# Patient Record
Sex: FEMALE | Race: WHITE | Employment: FULL TIME | URBAN - NONMETROPOLITAN AREA
[De-identification: names, ages, dates, MRNs, and addresses within clinical notes are randomized per-mention and may not be internally consistent; named-entity substitution may affect disease eponyms.]

---

## 2017-12-05 ENCOUNTER — OFFICE VISIT (OUTPATIENT)
Dept: OPTOMETRY | Age: 32
End: 2017-12-05
Payer: COMMERCIAL

## 2017-12-05 DIAGNOSIS — H52.203 HYPEROPIA OF BOTH EYES WITH ASTIGMATISM: Primary | ICD-10-CM

## 2017-12-05 DIAGNOSIS — H52.03 HYPEROPIA OF BOTH EYES WITH ASTIGMATISM: Primary | ICD-10-CM

## 2017-12-05 PROCEDURE — 92014 COMPRE OPH EXAM EST PT 1/>: CPT | Performed by: OPTOMETRIST

## 2017-12-05 RX ORDER — BENOXINATE HCL/FLUORESCEIN SOD 0.4%-0.25%
1 DROPS OPHTHALMIC (EYE) ONCE
Status: COMPLETED | OUTPATIENT
Start: 2017-12-05 | End: 2017-12-05

## 2017-12-05 RX ADMIN — Medication 1 DROP: at 09:28

## 2017-12-05 ASSESSMENT — KERATOMETRY
OD_K1POWER_DIOPTERS: 42.75
OS_K1POWER_DIOPTERS: 42.50
OS_AXISANGLE2_DEGREES: 178
OS_K2POWER_DIOPTERS: 43.50
OD_AXISANGLE_DEGREES: 083
OS_AXISANGLE_DEGREES: 088
OD_AXISANGLE2_DEGREES: 173
OD_K2POWER_DIOPTERS: 43.50

## 2017-12-05 ASSESSMENT — REFRACTION_MANIFEST
OS_AXIS: 010
OD_AXIS: 122
OS_CYLINDER: -0.25
OD_SPHERE: +0.50
OD_CYLINDER: -0.25
OS_SPHERE: +0.50

## 2017-12-05 ASSESSMENT — REFRACTION_WEARINGRX
OS_AXIS: 015
OD_AXIS: 125
OD_CYLINDER: -0.25
SPECS_TYPE: SVL
OD_SPHERE: +0.50
OS_SPHERE: +0.50
OS_CYLINDER: -0.25

## 2017-12-05 ASSESSMENT — VISUAL ACUITY
METHOD: SNELLEN - LINEAR
OD_SC: 20/20
OS_SC: 20/20

## 2017-12-05 ASSESSMENT — SLIT LAMP EXAM - LIDS
COMMENTS: NORMAL
COMMENTS: NORMAL

## 2017-12-05 ASSESSMENT — TONOMETRY
OD_IOP_MMHG: 19
OS_IOP_MMHG: 19
IOP_METHOD: APPLANATION W FLURESS DROP

## 2019-08-23 ENCOUNTER — OFFICE VISIT (OUTPATIENT)
Dept: OPTOMETRY | Age: 34
End: 2019-08-23
Payer: COMMERCIAL

## 2019-08-23 DIAGNOSIS — H52.203 HYPEROPIA OF BOTH EYES WITH ASTIGMATISM: ICD-10-CM

## 2019-08-23 DIAGNOSIS — H52.03 HYPEROPIA OF BOTH EYES WITH ASTIGMATISM: ICD-10-CM

## 2019-08-23 DIAGNOSIS — H53.8 BLURRED VISION, BILATERAL: Primary | ICD-10-CM

## 2019-08-23 DIAGNOSIS — R51.9 ACUTE NONINTRACTABLE HEADACHE, UNSPECIFIED HEADACHE TYPE: ICD-10-CM

## 2019-08-23 PROCEDURE — G8421 BMI NOT CALCULATED: HCPCS | Performed by: OPTOMETRIST

## 2019-08-23 PROCEDURE — G8427 DOCREV CUR MEDS BY ELIG CLIN: HCPCS | Performed by: OPTOMETRIST

## 2019-08-23 PROCEDURE — 4004F PT TOBACCO SCREEN RCVD TLK: CPT | Performed by: OPTOMETRIST

## 2019-08-23 PROCEDURE — 99214 OFFICE O/P EST MOD 30 MIN: CPT | Performed by: OPTOMETRIST

## 2019-08-23 ASSESSMENT — VISUAL ACUITY
OS_SC: 20/20
OD_SC+: -1
OS_SC+: -1
METHOD: SNELLEN - LINEAR
OD_SC: 20/20 OU
OD_SC: 20/20

## 2019-08-23 ASSESSMENT — ENCOUNTER SYMPTOMS
ALLERGIC/IMMUNOLOGIC NEGATIVE: 0
GASTROINTESTINAL NEGATIVE: 0
RESPIRATORY NEGATIVE: 0
EYES NEGATIVE: 0

## 2019-08-23 ASSESSMENT — TONOMETRY
OS_IOP_MMHG: 21
IOP_METHOD: NON-CONTACT AIR PUFF
OD_IOP_MMHG: 18

## 2019-08-23 ASSESSMENT — REFRACTION_WEARINGRX
SPECS_TYPE: SVL
OS_AXIS: 010
OD_SPHERE: +0.50
OD_AXIS: 122
OS_CYLINDER: -0.25
OS_SPHERE: +0.50
OD_CYLINDER: -0.25

## 2019-08-23 ASSESSMENT — SLIT LAMP EXAM - LIDS
COMMENTS: NORMAL
COMMENTS: NORMAL

## 2019-08-23 ASSESSMENT — REFRACTION_MANIFEST
OD_AXIS: 115
OS_AXIS: 010
OS_CYLINDER: -0.50
OD_CYLINDER: -0.50
OS_SPHERE: +1.00
OD_SPHERE: +1.00

## 2021-01-23 ENCOUNTER — HOSPITAL ENCOUNTER (OUTPATIENT)
Age: 36
Setting detail: SPECIMEN
Discharge: HOME | End: 2021-01-23
Payer: MEDICARE

## 2021-01-23 ENCOUNTER — OFFICE VISIT (OUTPATIENT)
Dept: FAMILY MEDICINE CLINIC | Age: 36
End: 2021-01-23
Payer: MEDICARE

## 2021-01-23 VITALS
DIASTOLIC BLOOD PRESSURE: 62 MMHG | TEMPERATURE: 98.7 F | OXYGEN SATURATION: 98 % | BODY MASS INDEX: 19.2 KG/M2 | SYSTOLIC BLOOD PRESSURE: 98 MMHG | HEART RATE: 104 BPM | WEIGHT: 105 LBS

## 2021-01-23 DIAGNOSIS — J32.8 OTHER CHRONIC SINUSITIS: ICD-10-CM

## 2021-01-23 DIAGNOSIS — J01.91 ACUTE RECURRENT SINUSITIS, UNSPECIFIED LOCATION: ICD-10-CM

## 2021-01-23 DIAGNOSIS — R22.0 LEFT FACIAL SWELLING: ICD-10-CM

## 2021-01-23 DIAGNOSIS — J01.91 ACUTE RECURRENT SINUSITIS, UNSPECIFIED LOCATION: Primary | ICD-10-CM

## 2021-01-23 PROCEDURE — 99212 OFFICE O/P EST SF 10 MIN: CPT

## 2021-01-23 PROCEDURE — 99213 OFFICE O/P EST LOW 20 MIN: CPT | Performed by: PHYSICIAN ASSISTANT

## 2021-01-23 PROCEDURE — 4004F PT TOBACCO SCREEN RCVD TLK: CPT | Performed by: PHYSICIAN ASSISTANT

## 2021-01-23 PROCEDURE — G8420 CALC BMI NORM PARAMETERS: HCPCS | Performed by: PHYSICIAN ASSISTANT

## 2021-01-23 PROCEDURE — G8427 DOCREV CUR MEDS BY ELIG CLIN: HCPCS | Performed by: PHYSICIAN ASSISTANT

## 2021-01-23 PROCEDURE — G8484 FLU IMMUNIZE NO ADMIN: HCPCS | Performed by: PHYSICIAN ASSISTANT

## 2021-01-23 PROCEDURE — U0003 INFECTIOUS AGENT DETECTION BY NUCLEIC ACID (DNA OR RNA); SEVERE ACUTE RESPIRATORY SYNDROME CORONAVIRUS 2 (SARS-COV-2) (CORONAVIRUS DISEASE [COVID-19]), AMPLIFIED PROBE TECHNIQUE, MAKING USE OF HIGH THROUGHPUT TECHNOLOGIES AS DESCRIBED BY CMS-2020-01-R: HCPCS

## 2021-01-23 RX ORDER — LEVOFLOXACIN 500 MG/1
500 TABLET, FILM COATED ORAL DAILY
Qty: 10 TABLET | Refills: 0 | Status: SHIPPED | OUTPATIENT
Start: 2021-01-23 | End: 2021-02-02

## 2021-01-23 RX ORDER — PREDNISONE 20 MG/1
20 TABLET ORAL 2 TIMES DAILY
Qty: 10 TABLET | Refills: 0 | Status: SHIPPED | OUTPATIENT
Start: 2021-01-23 | End: 2021-01-28

## 2021-01-23 ASSESSMENT — ENCOUNTER SYMPTOMS
SORE THROAT: 0
COUGH: 1
TROUBLE SWALLOWING: 0
SINUS PAIN: 1
WHEEZING: 0
DIARRHEA: 0
VOMITING: 0
SHORTNESS OF BREATH: 0
SINUS PRESSURE: 1
CHEST TIGHTNESS: 0
NAUSEA: 0
RHINORRHEA: 1

## 2021-01-23 ASSESSMENT — PATIENT HEALTH QUESTIONNAIRE - PHQ9
SUM OF ALL RESPONSES TO PHQ QUESTIONS 1-9: 0

## 2021-01-23 NOTE — PROGRESS NOTES
[facility] Practice    Subjective:      Patient ID: Tod Song is a 28 y.o. y.o. female. Patient is seen due to sinus sx for several months. Has green mucous from nose. Nose is really dry some blood noted. The left ear has pressure popping and cracking noted. Just the left face and ear hurts. The left maxillary area was swollen this am.  Was on zpak first did nothing. Then was on bactrim it helped while on it and then sx returned. Finished this 2 weeks ago. No humidifier. Took tylenol it helps some. Has flonase has not used. No one mentioned using sinus rinses to her. Is a smoker. Has taste and smell. She came in today due to the pain and swelling in the left face. She has not had this kind of thing until today. Over the past few months all sx have been on left face neck throat. Throat is fine now but hurt initially  Initially the postnasal drip was much worse. Sx have changed over the months but will never go away. She admits she has poor dentition as well has not seen a dentist.  Her teeth do not hurt currently and no oral abscess noted either.           Past Medical History:   Diagnosis Date    Allergic rhinitis     Anxiety and depression     Asthma     as a child, no recent symptoms    Gestational hypertension     history of    Hyperopia with astigmatism     Panic disorder     Tobacco use        Past Surgical History:   Procedure Laterality Date     SECTION, LOW TRANSVERSE  2006    Dr. Brian Stone, [facility] [address] [place] [address]      dysfunctional uterine bleeding       Family History   Problem Relation Age of Onset    Cancer Mother    [place]ec[place] Re Migraines Mother     Depression Mother     Cancer Father         unsure of type    Asthma Son     Glaucoma Neg Hx     Cataracts Neg Hx     Diabetes Neg Hx        Allergies   Allergen Reactions    Penicillins Rash       Current Outpatient Medications   Medication Sig Dispense Refill    levoFLOXacin line had pain in the left maxillary area and face. Right Ear: Tympanic membrane, ear canal and external ear normal.      Left Ear: Tympanic membrane, ear canal and external ear normal.      Nose: No congestion or rhinorrhea. Comments: Nasal turbinates are mildly boggy and dry. Mouth/Throat:      Mouth: Mucous membranes are moist.      Pharynx: No oropharyngeal exudate or posterior oropharyngeal erythema. Comments: Postnasal drip noted. Eyes:      General: No scleral icterus. Conjunctiva/sclera: Conjunctivae normal.      Comments: She has dark circles infraorbitally. Neck:      Musculoskeletal: Normal range of motion and neck supple. No neck rigidity or muscular tenderness. Cardiovascular:      Rate and Rhythm: Normal rate and regular rhythm. Heart sounds: Normal heart sounds. No murmur. Pulmonary:      Effort: Pulmonary effort is normal. No respiratory distress. Breath sounds: Normal breath sounds. No wheezing, rhonchi or rales. Abdominal:      General: There is no distension. Palpations: Abdomen is soft. There is no mass. Tenderness: There is no abdominal tenderness. There is no guarding or rebound. Hernia: No hernia is present. Comments: BS diminished in general.   Musculoskeletal:         General: No swelling, tenderness, deformity or signs of injury. Right lower leg: No edema. Left lower leg: No edema. Lymphadenopathy:      Cervical: No cervical adenopathy. Skin:     General: Skin is warm and dry. Findings: No erythema or rash. Neurological:      General: No focal deficit present. Mental Status: She is alert and oriented to person, place, and time. Sensory: No sensory deficit. Gait: Gait normal.   Psychiatric:         Mood and Affect: Mood normal.         Behavior: Behavior normal.           Assessment & Plan:     1. Acute recurrent sinusitis, unspecified location    - levoFLOXacin (LEVAQUIN) 500 MG tablet;  Take 1 tablet by mouth daily for 10 days  Dispense: 10 tablet; Refill: 0  - predniSONE (DELTASONE) 20 MG tablet; Take 1 tablet by mouth 2 times daily for 5 days  Dispense: 10 tablet; Refill: 0  - CT SINUS W WO CONTRAST; Future  - COVID-19 Ambulatory; Future    2. Left facial swelling    - levoFLOXacin (LEVAQUIN) 500 MG tablet; Take 1 tablet by mouth daily for 10 days  Dispense: 10 tablet; Refill: 0  - predniSONE (DELTASONE) 20 MG tablet; Take 1 tablet by mouth 2 times daily for 5 days  Dispense: 10 tablet; Refill: 0  - CT SINUS W WO CONTRAST; Future  - COVID-19 Ambulatory; Future      Will refer to ENT but want to see CT first and covid test  Warm compresses recommended  Sent to [facility] for stat CT sinuses  Will call patient later today with results  Answered her questions  Needs to quarantine until covid test is back   She can be given work note. Fluids rest   Recommended humidifier  Start using her flonase  Further recommendations based on CT results  Needs follow up visit will give guidance based on results  Needs to see dentistry as well      I contacted patient with results of CT sinuses she had chronic left maxillary and sphenoid sinusitis and acute and chronic right maxillary sinusitis. Will refer to ENT   Advised most likely will need longer tx with antibiotic but will have her follow up appropriately with PCP or us at walk in clinic. Will see how soon ENT can get her in as well.   Answered questions    Yaneli Junior  1/23/2021 9:33 AM EST    (Pleasenote that portions of this note were completed with a voice recognition program.Efforts were made to edit the dictations but occasionally words are mis-transcribed.)

## 2021-01-23 NOTE — LETTER
[facility]  [address] [address]  Phone: [phone]  Fax: [phone]    BELGICA Burnett NP          Patient: Eloisa Up   YOB: 1985   Date of Visit: 01/23/2021       To Whom it May Concern:    Checo Has was seen in my clinic on 01/23/2021. She may return to work/school after a negative covid test result (results expected in appx 5-7 days) and marked symptom improvement. Patient should be fever free for 24 hours without medication. If you have any questions or concerns, please don't hesitate to call.     Sincerely,         BELGICA Burnett NP

## 2021-01-26 ENCOUNTER — OFFICE VISIT (OUTPATIENT)
Dept: OTOLARYNGOLOGY | Age: 36
End: 2021-01-26
Payer: MEDICARE

## 2021-01-26 VITALS
SYSTOLIC BLOOD PRESSURE: 118 MMHG | HEIGHT: 62 IN | BODY MASS INDEX: 19.36 KG/M2 | DIASTOLIC BLOOD PRESSURE: 84 MMHG | WEIGHT: 105.2 LBS

## 2021-01-26 DIAGNOSIS — J34.3 HYPERTROPHY OF INFERIOR NASAL TURBINATE: ICD-10-CM

## 2021-01-26 DIAGNOSIS — J32.4 CHRONIC PANSINUSITIS: ICD-10-CM

## 2021-01-26 DIAGNOSIS — J34.89 SINUS PRESSURE: ICD-10-CM

## 2021-01-26 DIAGNOSIS — R09.81 NASAL CONGESTION: Primary | ICD-10-CM

## 2021-01-26 LAB — SARS-COV-2, NAA: NOT DETECTED

## 2021-01-26 PROCEDURE — G8484 FLU IMMUNIZE NO ADMIN: HCPCS | Performed by: OTOLARYNGOLOGY

## 2021-01-26 PROCEDURE — 31231 NASAL ENDOSCOPY DX: CPT | Performed by: OTOLARYNGOLOGY

## 2021-01-26 PROCEDURE — 99204 OFFICE O/P NEW MOD 45 MIN: CPT | Performed by: OTOLARYNGOLOGY

## 2021-01-26 PROCEDURE — G8420 CALC BMI NORM PARAMETERS: HCPCS | Performed by: OTOLARYNGOLOGY

## 2021-01-26 PROCEDURE — 99212 OFFICE O/P EST SF 10 MIN: CPT

## 2021-01-26 PROCEDURE — G8427 DOCREV CUR MEDS BY ELIG CLIN: HCPCS | Performed by: OTOLARYNGOLOGY

## 2021-01-26 PROCEDURE — 4004F PT TOBACCO SCREEN RCVD TLK: CPT | Performed by: OTOLARYNGOLOGY

## 2021-01-26 NOTE — PROGRESS NOTES
2021 9:24 AM IRON Cardenas (:  1985) is a 28 y.o. female,New patient, here for evaluation of the following chief complaint(s):  Sinus Problem (referred from Brenda Mi for recurrent sinusitis)      ASSESSMENT/PLAN:  1. Nasal congestion    2. Chronic pansinusitis    3. Hypertrophy of inferior nasal turbinate    4. Sinus pressure      1. Nasal congestion  2. Chronic pansinusitis  3. Hypertrophy of inferior nasal turbinate  4. Sinus pressure    She wishes to continue medical management for now. She has follow-up with Brenda KIDD and she wishes to trial a 21 day antibiotic course. I discussed that if she continues to have symptoms following this trial of maximum medical management the next step would be endoscopic sinus surgery. Based on her symptoms, previous treatment and recent CT findings, she is already a candidate for endoscopic sinus surgery. Likely source of continued symptoms is the opacified left sphenoid sinus. However she wishes to pursue further medical options with her primary care. Briefly discussed endoscopic sinus surgery including risks, benefits, indications, alternatives, recovery. She may benefit from a repeat course of steroids during her next trial of antibiotics. She will call us in the next few weeks if the symptoms do not improve. Discussed that we would need to repeat her CT sinus imaging at [facility]) with [employer] navigation protocol. No follow-ups on file. SUBJECTIVE/OBJECTIVE:  HPI  27-year-old woman with chronic sinusitis. She endorses a strong childhood history of allergies. She also has a strong family history of allergies. They improved as she grew older and last year she would take a Claritin only as needed. She endorses 1-2 episodes of sinus infection versus upper respiratory infections a year.   More recently over the last few months she has been on 3 courses of antibiotics starting with a Z-Eulogio and then Bactrim, Flonase, antihistamine and currently is on Levaquin, prednisone. Her current symptoms include facial pain and pressure, worse on left side, bilateral nasal obstruction, green nasal drainage, denies change in smell. She has had minimal improvement in her left-sided facial pressure, the most prominent feature. She denies vision changes. Endorses some dryness to the front of her nose. Her current episode started with copious sinus drainage. She also experienced left facial swelling but that has improved on prednisone. She has discussed with Jesus KIDD re 1 month of antibiotic treatment as the next step. CT sinus performed 1/23/2021 at [facility]:  FINDINGS:            The paranasal sinuses are paired and well-developed bilaterally.            There is moderate chronic right and mild chronic left maxillary sinusitis.          There is mild mucosal thickening in the ethmoid sinuses and there is opacification of the left    sphenoid sinus.  The frontal sinuses are clear.          No air fluid levels, soft tissue masses or osseous erosions.          No mastoid effusions.          The regional bones are intact.            IMPRESSION:     1.  Multifocal sinusitis.           Review of Systems  ENT ROS: positive for - sinus pain    General: The patient is found to be alert and normally responsive female with grossly normal hearing, clear voice and normal articulation. Communication is without difficulty. Voice: Clear   Skin: The skin has normal colour and turgor. Face: The facial contour is symmetric at rest and with movement. Ears: The pinnae have normal contours. AD: EAC clear, TM intact, no effusion/erythema/retraction   AS: EAC clear, TM intact, no effusion/erythema/retraction   Eye: The ocular movements are full and symmetric, the conjunctiva is unremarkable; sclera are anicteric, pupillary response is symmetric. No nystagmus is found.     Nose:   The external nasal contour is normal  The nasal mucosa is inflamed  The nasal septum is straight. The turbinates are enlarged  The nares are patent without evidence of polyposis   Oral cavity:   The dentition is healthy. The oral mucosa is without lesions;  the tongue is symmetric with full mobility and is without fasciculation. The soft palate is symmetric. The oropharynx is unremarkable. Neck: The neck has a normal contour; no masses are found on palpation    Fiberoptic examination of the upper airway using scope was conducted after first applying topical phenylephrine (1%) and lidocaine (4%) to the bilateral nasal cavity by spray. The endoscope was inserted and advanced through the bilateral side of the nose examining the mucosa and nasal structures. Right:  Inferior turbinate hypertrophic, middle meatus clear, mucosa is inflamed, no polyps or purulence  Left:  Inferior turbinate hypertrophic, middle meatus clear, mucosa is inflamed, no polyps or purulence  Nasopharynx clear, ET patent, adenoid small. Septum midline. An electronic signature was used to authenticate this note.     --Nancy Burch MD     1/26/2021 9:24 AM EST

## 2021-02-05 ENCOUNTER — VIRTUAL VISIT (OUTPATIENT)
Dept: FAMILY MEDICINE CLINIC | Age: 36
End: 2021-02-05
Payer: MEDICARE

## 2021-02-05 DIAGNOSIS — J01.91 ACUTE RECURRENT SINUSITIS, UNSPECIFIED LOCATION: Primary | ICD-10-CM

## 2021-02-05 PROCEDURE — G8427 DOCREV CUR MEDS BY ELIG CLIN: HCPCS | Performed by: PHYSICIAN ASSISTANT

## 2021-02-05 PROCEDURE — 99212 OFFICE O/P EST SF 10 MIN: CPT | Performed by: PHYSICIAN ASSISTANT

## 2021-02-05 PROCEDURE — 99211 OFF/OP EST MAY X REQ PHY/QHP: CPT

## 2021-02-05 RX ORDER — FLUTICASONE PROPIONATE 50 MCG
1 SPRAY, SUSPENSION (ML) NASAL DAILY
COMMUNITY
Start: 2021-01-05 | End: 2021-03-26

## 2021-02-05 RX ORDER — NAPROXEN SODIUM 550 MG/1
550 TABLET ORAL 2 TIMES DAILY WITH MEALS
Qty: 30 TABLET | Refills: 1 | Status: SHIPPED | OUTPATIENT
Start: 2021-02-05 | End: 2021-03-26

## 2021-02-05 RX ORDER — CEFDINIR 300 MG/1
300 CAPSULE ORAL 2 TIMES DAILY
Qty: 60 CAPSULE | Refills: 0 | Status: SHIPPED | OUTPATIENT
Start: 2021-02-05 | End: 2021-03-07

## 2021-02-05 ASSESSMENT — ENCOUNTER SYMPTOMS
SORE THROAT: 0
SHORTNESS OF BREATH: 0
WHEEZING: 0
CHEST TIGHTNESS: 0
SINUS PAIN: 1
SINUS PRESSURE: 1
TROUBLE SWALLOWING: 0
DIARRHEA: 0
RHINORRHEA: 1
NAUSEA: 1
COUGH: 0
VOMITING: 0
EYE DISCHARGE: 1

## 2021-02-05 ASSESSMENT — PATIENT HEALTH QUESTIONNAIRE - PHQ9
SUM OF ALL RESPONSES TO PHQ QUESTIONS 1-9: 2
SUM OF ALL RESPONSES TO PHQ9 QUESTIONS 1 & 2: 2
SUM OF ALL RESPONSES TO PHQ QUESTIONS 1-9: 2

## 2021-02-05 NOTE — PATIENT INSTRUCTIONS
Patient Education        Learning About Benefits From Quitting Smoking  How does quitting smoking make you healthier? If you're thinking about quitting smoking, you may have a few reasons to be smoke-free. Your health may be one of them. · When you quit smoking, you lower your risks for cancer, lung disease, heart attack, stroke, blood vessel disease, and blindness from macular degeneration. · When you're smoke-free, you get sick less often, and you heal faster. You are less likely to get colds, flu, bronchitis, and pneumonia. · As a nonsmoker, you may find that your mood is better and you are less stressed. When and how will you feel healthier? Quitting has real health benefits that start from day 1 of being smoke-free. And the longer you stay smoke-free, the healthier you get and the better you feel. The first hours  · After just 20 minutes, your blood pressure and heart rate go down. That means there's less stress on your heart and blood vessels. · Within 12 hours, the level of carbon monoxide in your blood drops back to normal. That makes room for more oxygen. With more oxygen in your body, you may notice that you have more energy than when you smoked. After 2 weeks  · Your lungs start to work better. · Your risk of heart attack starts to drop. After 1 month  · When your lungs are clear, you cough less and breathe deeper, so it's easier to be active. · Your sense of taste and smell return. That means you can enjoy food more than you have since you started smoking. Over the years  · Over the years, your risks of heart disease, heart attack, and stroke are lower. · After 10 years, your risk of dying from lung cancer is cut by about half. And your risk for many other types of cancer is lower too. How would quitting help others in your life? When you quit smoking, you improve the health of everyone who now breathes in your smoke. · Their heart, lung, and cancer risks drop, much like yours. · They are sick less. For babies and small children, living smoke-free means they're less likely to have ear infections, pneumonia, and bronchitis. · If you're a woman who is or will be pregnant someday, quitting smoking means a healthier . · Children who are close to you are less likely to become adult smokers. Where can you learn more? Go to https://chpeshaanewwesley.Planet Daily. org and sign in to your [employer] account. Enter [phone] in the [facility] box to learn more about \"Learning About Benefits From Quitting Smoking. \"     If you do not have an account, please click on the \"Sign Up Now\" link. Current as of: 2020               Content Version: 12.6  © 8792-6446 [employer]. Care instructions adapted under license by [facility] ([facility]). If you have questions about a medical condition or this instruction, always ask your healthcare professional. [place] [address] any warranty or liability for your use of this information.

## 2021-02-05 NOTE — PROGRESS NOTES
2021    TELEHEALTH EVALUATION -- Audio/Visual (During HDLAD-56 public health emergency)    HPI:    Linda Mason (:  1985) has requested an audio/video evaluation for the following concern(s):    Patient is seen today following up on sinusitis. At this time she is not feeling better. I gave her levaquin and prednisone on 21. She saw ENT and decided to wait on surgery wants to try prolonged treatment. Eyes are seeping in am the past few days. No facial swelling. No fever chills. The HA's really bother her right now. They do not go away right away with antibiotics. She is taking tylenol currently. Taking 6-8 tabs a day. Wakes up with it and there all day. Pain is mid frontal area. Review of Systems   Constitutional: Negative for appetite change, chills, fatigue and fever. HENT: Positive for postnasal drip, rhinorrhea, sinus pressure and sinus pain. Negative for congestion, sneezing, sore throat and trouble swallowing. Eyes: Positive for discharge. Eyes are crusting and matted the past few days. Respiratory: Negative for cough, chest tightness, shortness of breath and wheezing. Cardiovascular: Negative for chest pain and palpitations. Gastrointestinal: Positive for nausea. Negative for diarrhea and vomiting. Genitourinary: Negative for difficulty urinating and dysuria. Musculoskeletal: Positive for neck pain and neck stiffness. Negative for arthralgias, gait problem and myalgias. Left side of neck is hurting too. Skin: Negative for rash. Neurological: Positive for dizziness, light-headedness and headaches. Negative for numbness. Psychiatric/Behavioral: Positive for sleep disturbance. The patient is not nervous/anxious. Prior to Visit Medications    Medication Sig Taking?  Authorizing Provider   fluticasone (FLONASE) 50 MCG/ACT nasal spray 1 spray by Nasal route daily Yes Historical Provider, MD cefdinir (OMNICEF) 300 MG capsule Take 1 capsule by mouth 2 times daily Yes MARTI Holder   naproxen sodium (ANAPROX) 550 MG tablet Take 1 tablet by mouth 2 times daily (with meals) Yes MARTI Holder       Social History     Tobacco Use    Smoking status: Current Every Day Smoker     Packs/day: 1.00     Years: 10.00     Pack years: 10.00    Smokeless tobacco: Never Used   Substance Use Topics    Alcohol use: No    Drug use: No        Allergies   Allergen Reactions    Penicillins Rash   ,   Past Medical History:   Diagnosis Date    Allergic rhinitis     Anxiety and depression     Asthma     as a child, no recent symptoms    Gestational hypertension     history of    Hyperopia with astigmatism     Panic disorder     Tobacco use    ,   Past Surgical History:   Procedure Laterality Date     SECTION, LOW TRANSVERSE  2006    Dr. Helder Og, [address]  2009    dysfunctional uterine bleeding   ,   Social History     Tobacco Use    Smoking status: Current Every Day Smoker     Packs/day: 1.00     Years: 10.00     Pack years: 10.00    Smokeless tobacco: Never Used   Substance Use Topics    Alcohol use: No    Drug use: No   ,   Family History   Problem Relation Age of Onset    Cancer Mother    [facility] Migraines Mother     Depression Mother     Cancer Father         unsure of type    Asthma Son     Glaucoma Neg Hx     Cataracts Neg Hx     Diabetes Neg Hx    ,   Immunization History   Administered Date(s) Administered    MMR 1998    Td (Adult), 5 Lf Tetanus Toxoid, Pf (Tenivac, Decavac) 2006   ,   Health Maintenance   Topic Date Due    Hepatitis C screen  1985    Varicella vaccine (1 of 2 - 2-dose childhood series) 1986    Pneumococcal 0-64 years Vaccine (1 of 1 - PPSV23) 1991    HIV screen  2000    DTaP/Tdap/Td vaccine (1 - Tdap) 2004    Cervical cancer screen  2006  Flu vaccine (1) 09/01/2020    Hepatitis A vaccine  Aged Out    Hepatitis B vaccine  Aged Out    Hib vaccine  Aged Out    Meningococcal (ACWY) vaccine  Aged Out       PHYSICAL EXAMINATION:  [ INSTRUCTIONS:  \"[x]\" Indicates a positive item  \"[]\" Indicates a negative item  -- DELETE ALL ITEMS NOT EXAMINED]  Vital Signs: (As obtained by patient/caregiver or practitioner observation)    Blood pressure-  Heart rate-    Respiratory rate- WNL   Temperature-  Pulse oximetry-     Constitutional: [x] Appears well-developed and well-nourished [x] No apparent distress      [] Abnormal-   Mental status  [x] Alert and awake  [x] Oriented to person/place/time [x]Able to follow commands      Eyes:  EOM    [x]  Normal  [] Abnormal-  Sclera  [x]  Normal  [] Abnormal -         Discharge [x]  None visible  [] Abnormal -    HENT:   [x] Normocephalic, atraumatic.   [] Abnormal   [] Mouth/Throat: Mucous membranes are moist.     External Ears [x] Normal  [] Abnormal-     Neck: [x] No visualized mass     Pulmonary/Chest: [x] Respiratory effort normal.  [x] No visualized signs of difficulty breathing or respiratory distress        [] Abnormal-      Musculoskeletal:   [] Normal gait with no signs of ataxia         [x] Normal range of motion of neck        [] Abnormal-       Neurological:        [x] No Facial Asymmetry (Cranial nerve 7 motor function) (limited exam to video visit)          [x] No gaze palsy        [] Abnormal-         Skin:        [x] No significant exanthematous lesions or discoloration noted on facial skin         [] Abnormal-            Psychiatric:       [x] Normal Affect [x] No Hallucinations        [] Abnormal-     Other pertinent observable physical exam findings-   Ct Sinus Wo Contrast    Result Date: 1/24/2021 [facility], [address] [place] [address]                                                                                                 CT Scan Report                                                  Signed    Patient: Ambrose Alicia                                                                MR#: [id number]  [id number]          : 1985                                                                [de-identified]            Age/Sex: 28 / F                                                                ADM Date: 21            Loc: CT                                                                                     Attending Dr: Jennifer KIDD  Primary Care Dr. Corina Vang CNP      Ordering Physician: Markus KIDD  Date of Service: 21  Procedure(s): CT sinus wo con  Accession Number(s): [id number]    cc: Lisa Galvez CNP       TECHNIQUE/VIEWS:  Axial images obtained along with multiplanar reconstructed images from the 3D data set that includes coronal and sagittal views. Automatic exposure control techniques were used to obtain the study. CT of the sinuses without contrast.    CLINICAL HISTORY:  28year-old female with sinusitis which is not responding to antibiotic therapy    COMPARISON:  No relevant prior studies available. FINDINGS:      The paranasal sinuses are paired and well-developed bilaterally. There is moderate chronic right and mild chronic left maxillary sinusitis. There is mild mucosal thickening in the ethmoid sinuses and there is opacification of the left sphenoid sinus. The frontal sinuses are clear. No air fluid levels, soft tissue masses or osseous erosions. No mastoid effusions. The regional bones are intact. IMPRESSION:  1.  Multifocal sinusitis.         Dictated By:        Pawan Sanders Signed By:          <Electronically signed by Ganesh Marion DO>                                          01/25/21 1303                                                                                                                                                                          DD: 01/24/21                                                        TD/TT: 01/25/21 4851     : ALISHA    ASSESSMENT/PLAN:  1. Acute recurrent sinusitis, unspecified location    - cefdinir (OMNICEF) 300 MG capsule; Take 1 capsule by mouth 2 times daily  Dispense: 60 capsule; Refill: 0  - naproxen sodium (ANAPROX) 550 MG tablet; Take 1 tablet by mouth 2 times daily (with meals)  Dispense: 30 tablet; Refill: 1     fluids nsaids rest  Follow up 6 weeks sooner If not improving   Answered her questions  If does not improve will have to follow up with ENT  If any problems with medications let me kow    No follow-ups on file. Kvng Wolfe is a 28 y.o. female being evaluated by a Virtual Visit (video visit) encounter to address concerns as mentioned above. A caregiver was present when appropriate. Due to this being a TeleHealth encounter (During [facility]- public health emergency), evaluation of the following organ systems was limited: Vitals/Constitutional/EENT/Resp/CV/GI//MS/Neuro/Skin/Heme-Lymph-Imm. Pursuant to the emergency declaration under the [address] [place] [address] authority and the [employer] and [employer] Act, this Virtual Visit was conducted with patient's (and/or legal guardian's) consent, to reduce the patient's risk of exposure to COVID-19 and provide necessary medical care. The patient (and/or legal guardian) has also been advised to contact this office for worsening conditions or problems, and seek emergency medical treatment and/or call 911 if deemed necessary. Patient identification was verified at the start of the visit: Yes    Total time spent on this encounter: 10-15 minutes    Services were provided through a video synchronous discussion virtually to substitute for in-person clinic visit. Patient and provider were located at their individual homes. --MARTI Gustafson on 2/5/2021 at 3:05 PM    An electronic signature was used to authenticate this note.

## 2021-03-12 ENCOUNTER — TELEPHONE (OUTPATIENT)
Dept: OTOLARYNGOLOGY | Age: 36
End: 2021-03-12

## 2021-03-12 NOTE — TELEPHONE ENCOUNTER
Patient called x2. Spoke with Analisa Mccarthy who will call her on Monday to schedule surgery. Patient verbalized understanding.

## 2021-03-15 DIAGNOSIS — J32.9 CHRONIC SINUSITIS, UNSPECIFIED LOCATION: Primary | ICD-10-CM

## 2021-03-15 NOTE — TELEPHONE ENCOUNTER
Writer r/c to pt and scheduled her for CT sinus on 03/24/21 and a follow up to discuss sinus surgery on 03/26/21 with Dr Emmy Marshall. Pt verbalized understanding.

## 2021-03-24 ENCOUNTER — HOSPITAL ENCOUNTER (OUTPATIENT)
Dept: CT IMAGING | Age: 36
Discharge: HOME | End: 2021-03-26
Payer: MEDICARE

## 2021-03-24 DIAGNOSIS — J32.9 CHRONIC SINUSITIS, UNSPECIFIED LOCATION: ICD-10-CM

## 2021-03-24 PROCEDURE — 70486 CT MAXILLOFACIAL W/O DYE: CPT

## 2021-03-26 ENCOUNTER — OFFICE VISIT (OUTPATIENT)
Dept: OTOLARYNGOLOGY | Age: 36
End: 2021-03-26
Payer: MEDICARE

## 2021-03-26 VITALS
HEIGHT: 62 IN | DIASTOLIC BLOOD PRESSURE: 76 MMHG | BODY MASS INDEX: 19.32 KG/M2 | HEART RATE: 77 BPM | SYSTOLIC BLOOD PRESSURE: 118 MMHG | WEIGHT: 105 LBS | TEMPERATURE: 98.1 F

## 2021-03-26 DIAGNOSIS — J34.3 HYPERTROPHY OF INFERIOR NASAL TURBINATE: ICD-10-CM

## 2021-03-26 DIAGNOSIS — J32.9 CHRONIC SINUSITIS, UNSPECIFIED LOCATION: Primary | ICD-10-CM

## 2021-03-26 DIAGNOSIS — J34.89 SINUS PRESSURE: ICD-10-CM

## 2021-03-26 DIAGNOSIS — J34.2 DEVIATED SEPTUM: ICD-10-CM

## 2021-03-26 DIAGNOSIS — J32.4 CHRONIC PANSINUSITIS: ICD-10-CM

## 2021-03-26 DIAGNOSIS — R51.9 NONINTRACTABLE EPISODIC HEADACHE, UNSPECIFIED HEADACHE TYPE: ICD-10-CM

## 2021-03-26 PROCEDURE — 99213 OFFICE O/P EST LOW 20 MIN: CPT | Performed by: OTOLARYNGOLOGY

## 2021-03-26 PROCEDURE — G8420 CALC BMI NORM PARAMETERS: HCPCS | Performed by: OTOLARYNGOLOGY

## 2021-03-26 PROCEDURE — G8484 FLU IMMUNIZE NO ADMIN: HCPCS | Performed by: OTOLARYNGOLOGY

## 2021-03-26 PROCEDURE — G8428 CUR MEDS NOT DOCUMENT: HCPCS | Performed by: OTOLARYNGOLOGY

## 2021-03-26 PROCEDURE — 4004F PT TOBACCO SCREEN RCVD TLK: CPT | Performed by: OTOLARYNGOLOGY

## 2021-03-26 PROCEDURE — 99215 OFFICE O/P EST HI 40 MIN: CPT | Performed by: OTOLARYNGOLOGY

## 2021-03-26 NOTE — PROGRESS NOTES
3/26/2021 9:24 AM IRON Johnson (:  1985) is a 28 y.o. female,New patient, here for evaluation of the following chief complaint(s):  Nasal Congestion (follow up CT scan)      ASSESSMENT/PLAN:  1. Chronic sinusitis, unspecified location    2. Chronic pansinusitis    3. Sinus pressure    4. Hypertrophy of inferior nasal turbinate    5. Nonintractable episodic headache, unspecified headache type    6. Deviated septum      1. Chronic sinusitis, unspecified location  -     CBC With Auto Differential; Future  -     Basic Metabolic Panel; Future  -     EKG 12 lead; Future  2. Chronic pansinusitis  3. Sinus pressure  4. Hypertrophy of inferior nasal turbinate  5. Nonintractable episodic headache, unspecified headache type  6. Deviated septum    Continues to have chronic sinus pressure and headaches in particular across her forehead and around her eyes. She has undergone maximum medical management with some improvement but continued and chronic symptoms. CT scan shows continued structural issues, sinus opening narrowing, and sinus disease. Recommend bilateral endoscopic sinus surgery, septoplasty, bilateral inferior turbinate reduction. Cbc, bmp, ekg     No follow-ups on file. SUBJECTIVE/OBJECTIVE:  HPI  27-year-old woman with chronic sinusitis. She endorses a strong childhood history of allergies. She also has a strong family history of allergies. They improved as she grew older and last year she would take a Claritin only as needed. She endorses 1-2 episodes of sinus infection versus upper respiratory infections a year. More recently over the last few months she has been on 3 courses of antibiotics starting with a Z-Eulogio and then Bactrim, Flonase, antihistamine and currently is on Levaquin, prednisone. Her current symptoms include facial pain and pressure, worse on left side, bilateral nasal obstruction, green nasal drainage, denies change in smell.   She has had minimal improvement in her left-sided facial pressure, the most prominent feature. She denies vision changes. Endorses some dryness to the front of her nose. Her current episode started with copious sinus drainage. She also experienced left facial swelling but that has improved on prednisone. She has completed 3-week course of antibiotics but continues to have headaches and sinus pressure. He also endorses some nasal obstruction. CT sinus performed 1/23/2021 at [facility]:  FINDINGS:            The paranasal sinuses are paired and well-developed bilaterally.            There is moderate chronic right and mild chronic left maxillary sinusitis.          There is mild mucosal thickening in the ethmoid sinuses and there is opacification of the left    sphenoid sinus.  The frontal sinuses are clear.          No air fluid levels, soft tissue masses or osseous erosions.          No mastoid effusions.          The regional bones are intact.            IMPRESSION:     1.  Multifocal sinusitis.           Review of Systems  ENT ROS: positive for - sinus pain    General: The patient is found to be alert and normally responsive female with grossly normal hearing, clear voice and normal articulation. Communication is without difficulty. Voice: Clear   Skin: The skin has normal colour and turgor. Face: The facial contour is symmetric at rest and with movement. Ears: The pinnae have normal contours. AD: EAC clear, TM intact, no effusion/erythema/retraction   AS: EAC clear, TM intact, no effusion/erythema/retraction   Eye: The ocular movements are full and symmetric, the conjunctiva is unremarkable; sclera are anicteric, pupillary response is symmetric. No nystagmus is found. Nose:   The external nasal contour is normal  The nasal mucosa is inflamed  The caudal nasal septum is straight. The turbinates are enlarged  The nares are patent without evidence of polyposis   Oral cavity:   The dentition is healthy.   The oral mucosa is without lesions;  the tongue is symmetric with full mobility and is without fasciculation. The soft palate is symmetric. The oropharynx is unremarkable. Neck: The neck has a normal contour; no masses are found on palpation    Nasal endo 1/26:   Fiberoptic examination of the upper airway using scope was conducted after first applying topical phenylephrine (1%) and lidocaine (4%) to the bilateral nasal cavity by spray. The endoscope was inserted and advanced through the bilateral side of the nose examining the mucosa and nasal structures. Right:  Inferior turbinate hypertrophic, middle meatus clear, mucosa is inflamed, no polyps or purulence  Left:  Inferior turbinate hypertrophic, middle meatus clear, mucosa is inflamed, no polyps or purulence  Nasopharynx clear, ET patent, adenoid small. Septum deviated to the right    An electronic signature was used to authenticate this note.     --Marnie Wood MD     3/26/2021 9:24 AM EST

## 2021-04-21 ENCOUNTER — HOSPITAL ENCOUNTER (OUTPATIENT)
Dept: NON INVASIVE DIAGNOSTICS | Age: 36
Discharge: HOME | End: 2021-04-21
Payer: MEDICARE

## 2021-04-21 ENCOUNTER — HOSPITAL ENCOUNTER (OUTPATIENT)
Dept: LAB | Age: 36
Discharge: HOME | End: 2021-04-21
Payer: MEDICARE

## 2021-04-21 DIAGNOSIS — J32.9 CHRONIC SINUSITIS, UNSPECIFIED LOCATION: ICD-10-CM

## 2021-04-21 LAB
ABSOLUTE EOS #: 0.12 K/UL (ref 0–0.44)
ABSOLUTE IMMATURE GRANULOCYTE: <0.03 K/UL (ref 0–0.3)
ABSOLUTE LYMPH #: 2.23 K/UL (ref 1.1–3.7)
ABSOLUTE MONO #: 0.38 K/UL (ref 0.1–1.2)
ANION GAP SERPL CALCULATED.3IONS-SCNC: 8 MMOL/L (ref 9–17)
BASOPHILS # BLD: 1 % (ref 0–2)
BASOPHILS ABSOLUTE: 0.04 K/UL (ref 0–0.2)
BUN BLDV-MCNC: 16 MG/DL (ref 6–20)
BUN/CREAT BLD: 30 (ref 9–20)
CALCIUM SERPL-MCNC: 9.6 MG/DL (ref 8.6–10.4)
CHLORIDE BLD-SCNC: 104 MMOL/L (ref 98–107)
CO2: 28 MMOL/L (ref 20–31)
CREAT SERPL-MCNC: 0.54 MG/DL (ref 0.5–0.9)
DIFFERENTIAL TYPE: ABNORMAL
EKG ATRIAL RATE: 74 BPM
EKG P AXIS: 57 DEGREES
EKG P-R INTERVAL: 142 MS
EKG Q-T INTERVAL: 376 MS
EKG QRS DURATION: 86 MS
EKG QTC CALCULATION (BAZETT): 417 MS
EKG R AXIS: 62 DEGREES
EKG T AXIS: 53 DEGREES
EKG VENTRICULAR RATE: 74 BPM
EOSINOPHILS RELATIVE PERCENT: 2 % (ref 1–4)
GFR AFRICAN AMERICAN: >60 ML/MIN
GFR NON-AFRICAN AMERICAN: >60 ML/MIN
GFR SERPL CREATININE-BSD FRML MDRD: ABNORMAL ML/MIN/{1.73_M2}
GFR SERPL CREATININE-BSD FRML MDRD: ABNORMAL ML/MIN/{1.73_M2}
GLUCOSE BLD-MCNC: 78 MG/DL (ref 70–99)
HCT VFR BLD CALC: 41.5 % (ref 36.3–47.1)
HEMOGLOBIN: 13.9 G/DL (ref 11.9–15.1)
IMMATURE GRANULOCYTES: 0 %
LYMPHOCYTES # BLD: 37 % (ref 24–43)
MCH RBC QN AUTO: 34.2 PG (ref 25.2–33.5)
MCHC RBC AUTO-ENTMCNC: 33.5 G/DL (ref 25.2–33.5)
MCV RBC AUTO: 102 FL (ref 82.6–102.9)
MONOCYTES # BLD: 6 % (ref 3–12)
NRBC AUTOMATED: 0 PER 100 WBC
PDW BLD-RTO: 11.7 % (ref 11.8–14.4)
PLATELET # BLD: 212 K/UL (ref 138–453)
PLATELET ESTIMATE: ABNORMAL
PMV BLD AUTO: 8.6 FL (ref 8.1–13.5)
POTASSIUM SERPL-SCNC: 4.2 MMOL/L (ref 3.7–5.3)
RBC # BLD: 4.07 M/UL (ref 3.95–5.11)
RBC # BLD: ABNORMAL 10*6/UL
SEG NEUTROPHILS: 54 % (ref 36–65)
SEGMENTED NEUTROPHILS ABSOLUTE COUNT: 3.32 K/UL (ref 1.5–8.1)
SODIUM BLD-SCNC: 140 MMOL/L (ref 135–144)
WBC # BLD: 6.1 K/UL (ref 3.5–11.3)
WBC # BLD: ABNORMAL 10*3/UL

## 2021-04-21 PROCEDURE — 93005 ELECTROCARDIOGRAM TRACING: CPT

## 2021-04-21 PROCEDURE — 80048 BASIC METABOLIC PNL TOTAL CA: CPT

## 2021-04-21 PROCEDURE — 36415 COLL VENOUS BLD VENIPUNCTURE: CPT

## 2021-04-21 PROCEDURE — 85025 COMPLETE CBC W/AUTO DIFF WBC: CPT

## 2021-04-28 ENCOUNTER — TELEPHONE (OUTPATIENT)
Dept: PREADMISSION TESTING | Age: 36
End: 2021-04-28

## 2021-05-07 ENCOUNTER — TELEPHONE (OUTPATIENT)
Dept: OTOLARYNGOLOGY | Age: 36
End: 2021-05-07

## 2021-05-07 ENCOUNTER — HOSPITAL ENCOUNTER (OUTPATIENT)
Dept: PREADMISSION TESTING | Age: 36
Setting detail: SPECIMEN
Discharge: HOME | End: 2021-05-11
Payer: MEDICARE

## 2021-05-07 DIAGNOSIS — Z11.59 ENCOUNTER FOR SCREENING FOR OTHER VIRAL DISEASES: Primary | ICD-10-CM

## 2021-05-07 PROCEDURE — U0003 INFECTIOUS AGENT DETECTION BY NUCLEIC ACID (DNA OR RNA); SEVERE ACUTE RESPIRATORY SYNDROME CORONAVIRUS 2 (SARS-COV-2) (CORONAVIRUS DISEASE [COVID-19]), AMPLIFIED PROBE TECHNIQUE, MAKING USE OF HIGH THROUGHPUT TECHNOLOGIES AS DESCRIBED BY CMS-2020-01-R: HCPCS

## 2021-05-07 PROCEDURE — U0005 INFEC AGEN DETEC AMPLI PROBE: HCPCS

## 2021-05-07 NOTE — TELEPHONE ENCOUNTER
[facility]    Pre-Operative Evaluation/Consultation    Name:  Keri Orosco                                         Age:  28 y.o. MRN:  [id number]       :  1985   Date:  2021         Sex: female    There were no encounter diagnoses. Surgeon:  Dr. Shen Fernandes  Procedure (Planned):  Bilateral Endoscopic sinus surgery,septoplasty, turbinate reduction   Date Scheduled surgery: 2021    Attending : No att. providers found    Primary Physician: Aarti streeter  Cardiologist: None    Type of Anesthesia Requested: General    Patient Medical history: Allergies   Allergen Reactions    Penicillins Rash     Patient Active Problem List   Diagnosis    Hyperopia of both eyes with astigmatism     Past Medical History:   Diagnosis Date    Allergic rhinitis     Anxiety and depression     Asthma     as a child, no recent symptoms    Gestational hypertension     history of    Hyperopia with astigmatism     Panic disorder     Tobacco use      Past Surgical History:   Procedure Laterality Date     SECTION, LOW TRANSVERSE  2006    Dr. Mateo Mesa, [address]  2009    dysfunctional uterine bleeding     Social History     Tobacco Use    Smoking status: Current Every Day Smoker     Packs/day: 1.00     Years: 10.00     Pack years: 10.00    Smokeless tobacco: Never Used   Substance Use Topics    Alcohol use: No    Drug use: No     Medications:  No current outpatient medications on file. No current facility-administered medications for this visit. Scheduled Meds:  Continuous Infusions:  PRN Meds:. Prior to Admission medications    Not on File     Vital Signs (Current) [unfilled]    Weight:   Wt Readings from Last 1 Encounters:   21 105 lb (47.6 kg)     Height:   Ht Readings from Last 1 Encounters:   21 5' 2\" (1.575 m)      BMI:  There is no height or weight on file to calculate BMI.   Estimated body mass index is 19.2 kg/m² as calculated from the following:    Height as of 3/26/21: 5' 2\" (1.575 m). Weight as of 3/26/21: 105 lb (47.6 kg). body mass index is unknown because there is no height or weight on file. Cardiac Clearance: None   Medical Clearance:None   Appointment for surgery Clearance scheduled for:None      Preoperative Testing: These are the current and completed labs:  CBC:   Lab Results   Component Value Date    WBC 6.1 04/21/2021    RBC 4.07 04/21/2021    HGB 13.9 04/21/2021    HCT 41.5 04/21/2021    .0 04/21/2021    RDW 11.7 04/21/2021     04/21/2021     CMP:   Lab Results   Component Value Date     04/21/2021    K 4.2 04/21/2021     04/21/2021    CO2 28 04/21/2021    BUN 16 04/21/2021    CREATININE 0.54 04/21/2021    GFRAA >60 04/21/2021    LABGLOM >60 04/21/2021    GLUCOSE 78 04/21/2021    CALCIUM 9.6 04/21/2021     POC Tests: No results for input(s): POCGLU, POCNA, POCK, POCCL, POCBUN, POCHEMO, POCHCT in the last 72 hours.   Coags  No results found for: PROTIME, INR, APTT  HCG (If Applicable) No results found for: PREGTESTUR, PREGSERUM, HCG, HCGQUANT   ABGs No results found for: PHART, PO2ART, DJE0MNN, TNZ5FAY, BEART, F9OTXWYS   Type & Screen (If Applicable)  No results found for: Марина Lava    Additional ordered pre-operative testing:  [x]CBC    []ABG      [x] BMP   []URINALYSIS   []CMP    []HCG   []COAGS PT/INR  []T&C  []LFTs   []TYPE AND SCREEN    [x] EKG  [] Chest X-Ray  [] Other Radiology    [] Sent to Hospitalist None  [x] Sent to Anesthesia for your review:   [] Additional Orders: None     Comments:None   Requests:     Signed: Zeno Sacks, LPN 6/1/8277 5:15 PM

## 2021-05-08 LAB
SARS-COV-2: NORMAL
SARS-COV-2: NOT DETECTED
SOURCE: NORMAL

## 2021-05-11 NOTE — H&P
She also experienced left facial swelling but that has improved on prednisone. She has completed 3-week course of antibiotics but continues to have headaches and sinus pressure. He also endorses some nasal obstruction.     CT sinus performed 2021 at [facility]:  FINDINGS:            The paranasal sinuses are paired and well-developed bilaterally.            There is moderate chronic right and mild chronic left maxillary sinusitis.          There is mild mucosal thickening in the ethmoid sinuses and there is opacification of the left    sphenoid sinus.  The frontal sinuses are clear.          No air fluid levels, soft tissue masses or osseous erosions.          No mastoid effusions.          The regional bones are intact.            IMPRESSION:     1.  Multifocal sinusitis.            Past Medical History:   Diagnosis Date    Allergic rhinitis     Anxiety and depression     Asthma     as a child, no recent symptoms    Gestational hypertension     history of    Hyperopia with astigmatism     Panic disorder     Tobacco use      Past Surgical History:   Procedure Laterality Date     SECTION, LOW TRANSVERSE  2006    Dr. Elier Heck, [facility] [address] [place] [address]      dysfunctional uterine bleeding     Family History   Problem Relation Age of Onset    Cancer Mother    [facility] Migraines Mother     Depression Mother     Cancer Father         unsure of type    Asthma Son     Glaucoma Neg Hx     Cataracts Neg Hx     Diabetes Neg Hx      Social History     Tobacco History     Smoking Status  Current Every Day Smoker Smoking Frequency  1 pack/day for 10 years (10 pk yrs)    Smokeless Tobacco Use  Never Used          Alcohol History     Alcohol Use Status  No          Drug Use     Drug Use Status  No          Sexual Activity     Sexually Active  Not Currently Partners  Male              Allergies   Allergen Reactions    Penicillins Rash     No current outpatient medications       Review of Systems  ENT ROS: positive for - sinus pain     General: The patient is found to be alert and normally responsive female with grossly normal hearing, clear voice and normal articulation. Communication is without difficulty. Voice: Clear   Skin: The skin has normal colour and turgor. Face: The facial contour is symmetric at rest and with movement. Ears: The pinnae have normal contours. AD: EAC clear, TM intact, no effusion/erythema/retraction   AS: EAC clear, TM intact, no effusion/erythema/retraction   Eye: The ocular movements are full and symmetric, the conjunctiva is unremarkable; sclera are anicteric, pupillary response is symmetric. No nystagmus is found. Nose:   The external nasal contour is normal  The nasal mucosa is inflamed  The caudal nasal septum is straight. The turbinates are enlarged  The nares are patent without evidence of polyposis   Oral cavity:   The dentition is healthy. The oral mucosa is without lesions;  the tongue is symmetric with full mobility and is without fasciculation. The soft palate is symmetric. The oropharynx is unremarkable. Neck: The neck has a normal contour; no masses are found on palpation     Nasal endo 1/26:   Fiberoptic examination of the upper airway using scope was conducted after first applying topical phenylephrine (1%) and lidocaine (4%) to the bilateral nasal cavity by spray. The endoscope was inserted and advanced through the bilateral side of the nose examining the mucosa and nasal structures. Right:  Inferior turbinate hypertrophic, middle meatus clear, mucosa is inflamed, no polyps or purulence  Left:  Inferior turbinate hypertrophic, middle meatus clear, mucosa is inflamed, no polyps or purulence  Nasopharynx clear, ET patent, adenoid small.  Septum deviated to the right

## 2021-05-12 ENCOUNTER — ANESTHESIA EVENT (OUTPATIENT)
Dept: OPERATING ROOM | Age: 36
End: 2021-05-12
Payer: MEDICARE

## 2021-05-12 ENCOUNTER — HOSPITAL ENCOUNTER (OUTPATIENT)
Age: 36
Setting detail: OUTPATIENT SURGERY
Discharge: HOME | End: 2021-05-12
Attending: OTOLARYNGOLOGY | Admitting: OTOLARYNGOLOGY
Payer: MEDICARE

## 2021-05-12 ENCOUNTER — ANESTHESIA (OUTPATIENT)
Dept: OPERATING ROOM | Age: 36
End: 2021-05-12
Payer: MEDICARE

## 2021-05-12 VITALS
HEIGHT: 62 IN | SYSTOLIC BLOOD PRESSURE: 127 MMHG | RESPIRATION RATE: 16 BRPM | HEART RATE: 74 BPM | BODY MASS INDEX: 19.14 KG/M2 | WEIGHT: 104 LBS | TEMPERATURE: 97.3 F | DIASTOLIC BLOOD PRESSURE: 77 MMHG | OXYGEN SATURATION: 97 %

## 2021-05-12 VITALS
OXYGEN SATURATION: 95 % | DIASTOLIC BLOOD PRESSURE: 72 MMHG | SYSTOLIC BLOOD PRESSURE: 141 MMHG | RESPIRATION RATE: 11 BRPM | TEMPERATURE: 97.4 F

## 2021-05-12 DIAGNOSIS — Z98.890 S/P FESS (FUNCTIONAL ENDOSCOPIC SINUS SURGERY): Primary | ICD-10-CM

## 2021-05-12 LAB — HCG, PREGNANCY URINE (POC): NEGATIVE

## 2021-05-12 PROCEDURE — 81025 URINE PREGNANCY TEST: CPT

## 2021-05-12 PROCEDURE — 7100000000 HC PACU RECOVERY - FIRST 15 MIN: Performed by: OTOLARYNGOLOGY

## 2021-05-12 PROCEDURE — 3600000004 HC SURGERY LEVEL 4 BASE: Performed by: OTOLARYNGOLOGY

## 2021-05-12 PROCEDURE — 2709999900 HC NON-CHARGEABLE SUPPLY: Performed by: OTOLARYNGOLOGY

## 2021-05-12 PROCEDURE — 6360000002 HC RX W HCPCS: Performed by: NURSE ANESTHETIST, CERTIFIED REGISTERED

## 2021-05-12 PROCEDURE — 2500000003 HC RX 250 WO HCPCS: Performed by: OTOLARYNGOLOGY

## 2021-05-12 PROCEDURE — 31256 EXPLORATION MAXILLARY SINUS: CPT | Performed by: OTOLARYNGOLOGY

## 2021-05-12 PROCEDURE — 2580000003 HC RX 258: Performed by: OTOLARYNGOLOGY

## 2021-05-12 PROCEDURE — 7100000001 HC PACU RECOVERY - ADDTL 15 MIN: Performed by: OTOLARYNGOLOGY

## 2021-05-12 PROCEDURE — 2580000003 HC RX 258: Performed by: NURSE ANESTHETIST, CERTIFIED REGISTERED

## 2021-05-12 PROCEDURE — 2500000003 HC RX 250 WO HCPCS: Performed by: NURSE ANESTHETIST, CERTIFIED REGISTERED

## 2021-05-12 PROCEDURE — 6370000000 HC RX 637 (ALT 250 FOR IP): Performed by: OTOLARYNGOLOGY

## 2021-05-12 PROCEDURE — 3700000000 HC ANESTHESIA ATTENDED CARE: Performed by: OTOLARYNGOLOGY

## 2021-05-12 PROCEDURE — 31298 NSL/SINS NDSC SURG FRNT&SPHN: CPT | Performed by: OTOLARYNGOLOGY

## 2021-05-12 PROCEDURE — C1726 CATH, BAL DIL, NON-VASCULAR: HCPCS | Performed by: OTOLARYNGOLOGY

## 2021-05-12 PROCEDURE — 31254 NSL/SINS NDSC W/PRTL ETHMDCT: CPT | Performed by: OTOLARYNGOLOGY

## 2021-05-12 PROCEDURE — 7100000010 HC PHASE II RECOVERY - FIRST 15 MIN: Performed by: OTOLARYNGOLOGY

## 2021-05-12 PROCEDURE — 3700000001 HC ADD 15 MINUTES (ANESTHESIA): Performed by: OTOLARYNGOLOGY

## 2021-05-12 PROCEDURE — 7100000011 HC PHASE II RECOVERY - ADDTL 15 MIN: Performed by: OTOLARYNGOLOGY

## 2021-05-12 PROCEDURE — 30140 RESECT INFERIOR TURBINATE: CPT | Performed by: OTOLARYNGOLOGY

## 2021-05-12 PROCEDURE — 3600000014 HC SURGERY LEVEL 4 ADDTL 15MIN: Performed by: OTOLARYNGOLOGY

## 2021-05-12 PROCEDURE — 30520 REPAIR OF NASAL SEPTUM: CPT | Performed by: OTOLARYNGOLOGY

## 2021-05-12 RX ORDER — SODIUM CHLORIDE 0.9 % (FLUSH) 0.9 %
5-40 SYRINGE (ML) INJECTION EVERY 12 HOURS SCHEDULED
Status: DISCONTINUED | OUTPATIENT
Start: 2021-05-12 | End: 2021-05-12 | Stop reason: HOSPADM

## 2021-05-12 RX ORDER — DOXYCYCLINE HYCLATE 100 MG
100 TABLET ORAL 2 TIMES DAILY
Qty: 14 TABLET | Refills: 0 | Status: SHIPPED | OUTPATIENT
Start: 2021-05-12 | End: 2021-05-19

## 2021-05-12 RX ORDER — OXYCODONE HYDROCHLORIDE AND ACETAMINOPHEN 5; 325 MG/1; MG/1
2 TABLET ORAL EVERY 4 HOURS PRN
Status: DISCONTINUED | OUTPATIENT
Start: 2021-05-12 | End: 2021-05-12 | Stop reason: HOSPADM

## 2021-05-12 RX ORDER — NEOSTIGMINE METHYLSULFATE 1 MG/ML
INJECTION, SOLUTION INTRAVENOUS PRN
Status: DISCONTINUED | OUTPATIENT
Start: 2021-05-12 | End: 2021-05-12 | Stop reason: SDUPTHER

## 2021-05-12 RX ORDER — ONDANSETRON 4 MG/1
4 TABLET, ORALLY DISINTEGRATING ORAL ONCE
Status: COMPLETED | OUTPATIENT
Start: 2021-05-12 | End: 2021-05-12

## 2021-05-12 RX ORDER — DEXAMETHASONE SODIUM PHOSPHATE 10 MG/ML
INJECTION INTRAMUSCULAR; INTRAVENOUS PRN
Status: DISCONTINUED | OUTPATIENT
Start: 2021-05-12 | End: 2021-05-12 | Stop reason: SDUPTHER

## 2021-05-12 RX ORDER — ONDANSETRON 2 MG/ML
INJECTION INTRAMUSCULAR; INTRAVENOUS PRN
Status: DISCONTINUED | OUTPATIENT
Start: 2021-05-12 | End: 2021-05-12 | Stop reason: SDUPTHER

## 2021-05-12 RX ORDER — PROPOFOL 10 MG/ML
INJECTION, EMULSION INTRAVENOUS PRN
Status: DISCONTINUED | OUTPATIENT
Start: 2021-05-12 | End: 2021-05-12 | Stop reason: SDUPTHER

## 2021-05-12 RX ORDER — SODIUM CHLORIDE, SODIUM LACTATE, POTASSIUM CHLORIDE, CALCIUM CHLORIDE 600; 310; 30; 20 MG/100ML; MG/100ML; MG/100ML; MG/100ML
INJECTION, SOLUTION INTRAVENOUS CONTINUOUS
Status: DISCONTINUED | OUTPATIENT
Start: 2021-05-12 | End: 2021-05-12 | Stop reason: HOSPADM

## 2021-05-12 RX ORDER — SODIUM CHLORIDE 9 MG/ML
25 INJECTION, SOLUTION INTRAVENOUS PRN
Status: DISCONTINUED | OUTPATIENT
Start: 2021-05-12 | End: 2021-05-12 | Stop reason: HOSPADM

## 2021-05-12 RX ORDER — SODIUM CHLORIDE 0.9 % (FLUSH) 0.9 %
5-40 SYRINGE (ML) INJECTION PRN
Status: DISCONTINUED | OUTPATIENT
Start: 2021-05-12 | End: 2021-05-12 | Stop reason: HOSPADM

## 2021-05-12 RX ORDER — MIDAZOLAM HYDROCHLORIDE 1 MG/ML
INJECTION INTRAMUSCULAR; INTRAVENOUS PRN
Status: DISCONTINUED | OUTPATIENT
Start: 2021-05-12 | End: 2021-05-12 | Stop reason: SDUPTHER

## 2021-05-12 RX ORDER — CLINDAMYCIN PHOSPHATE 900 MG/50ML
900 INJECTION INTRAVENOUS
Status: COMPLETED | OUTPATIENT
Start: 2021-05-12 | End: 2021-05-12

## 2021-05-12 RX ORDER — GINSENG 100 MG
CAPSULE ORAL PRN
Status: DISCONTINUED | OUTPATIENT
Start: 2021-05-12 | End: 2021-05-12 | Stop reason: ALTCHOICE

## 2021-05-12 RX ORDER — ROCURONIUM BROMIDE 10 MG/ML
INJECTION, SOLUTION INTRAVENOUS PRN
Status: DISCONTINUED | OUTPATIENT
Start: 2021-05-12 | End: 2021-05-12 | Stop reason: SDUPTHER

## 2021-05-12 RX ORDER — GLYCOPYRROLATE 1 MG/5 ML
SYRINGE (ML) INTRAVENOUS PRN
Status: DISCONTINUED | OUTPATIENT
Start: 2021-05-12 | End: 2021-05-12 | Stop reason: SDUPTHER

## 2021-05-12 RX ORDER — FENTANYL CITRATE 50 UG/ML
INJECTION, SOLUTION INTRAMUSCULAR; INTRAVENOUS PRN
Status: DISCONTINUED | OUTPATIENT
Start: 2021-05-12 | End: 2021-05-12 | Stop reason: SDUPTHER

## 2021-05-12 RX ORDER — LIDOCAINE HYDROCHLORIDE AND EPINEPHRINE 10; 10 MG/ML; UG/ML
INJECTION, SOLUTION INFILTRATION; PERINEURAL PRN
Status: DISCONTINUED | OUTPATIENT
Start: 2021-05-12 | End: 2021-05-12 | Stop reason: ALTCHOICE

## 2021-05-12 RX ORDER — OXYMETAZOLINE HYDROCHLORIDE 0.05 G/100ML
SPRAY NASAL PRN
Status: DISCONTINUED | OUTPATIENT
Start: 2021-05-12 | End: 2021-05-12 | Stop reason: ALTCHOICE

## 2021-05-12 RX ORDER — ONDANSETRON 4 MG/1
4 TABLET, ORALLY DISINTEGRATING ORAL 3 TIMES DAILY PRN
Qty: 10 TABLET | Refills: 0 | Status: SHIPPED | OUTPATIENT
Start: 2021-05-12 | End: 2021-07-12

## 2021-05-12 RX ORDER — LIDOCAINE HYDROCHLORIDE 20 MG/ML
INJECTION, SOLUTION EPIDURAL; INFILTRATION; INTRACAUDAL; PERINEURAL PRN
Status: DISCONTINUED | OUTPATIENT
Start: 2021-05-12 | End: 2021-05-12 | Stop reason: SDUPTHER

## 2021-05-12 RX ORDER — OXYCODONE HYDROCHLORIDE AND ACETAMINOPHEN 5; 325 MG/1; MG/1
2 TABLET ORAL EVERY 4 HOURS PRN
Qty: 20 TABLET | Refills: 0 | Status: SHIPPED | OUTPATIENT
Start: 2021-05-12 | End: 2021-05-15

## 2021-05-12 RX ADMIN — FENTANYL CITRATE 100 MCG: 50 INJECTION, SOLUTION INTRAMUSCULAR; INTRAVENOUS at 08:46

## 2021-05-12 RX ADMIN — Medication 0.5 MG: at 10:41

## 2021-05-12 RX ADMIN — PHENYLEPHRINE HYDROCHLORIDE 25 MCG/MIN: 10 INJECTION INTRAVENOUS at 09:16

## 2021-05-12 RX ADMIN — MIDAZOLAM HYDROCHLORIDE 4 MG: 1 INJECTION, SOLUTION INTRAMUSCULAR; INTRAVENOUS at 08:46

## 2021-05-12 RX ADMIN — DEXAMETHASONE SODIUM PHOSPHATE 10 MG: 10 INJECTION INTRAMUSCULAR; INTRAVENOUS at 09:08

## 2021-05-12 RX ADMIN — LIDOCAINE HYDROCHLORIDE 60 MG: 20 INJECTION, SOLUTION EPIDURAL; INFILTRATION; INTRACAUDAL; PERINEURAL at 11:02

## 2021-05-12 RX ADMIN — ONDANSETRON 4 MG: 2 INJECTION INTRAMUSCULAR; INTRAVENOUS at 09:08

## 2021-05-12 RX ADMIN — SODIUM CHLORIDE, POTASSIUM CHLORIDE, SODIUM LACTATE AND CALCIUM CHLORIDE: 600; 310; 30; 20 INJECTION, SOLUTION INTRAVENOUS at 08:19

## 2021-05-12 RX ADMIN — Medication 0.5 MG: at 10:56

## 2021-05-12 RX ADMIN — ONDANSETRON 4 MG: 4 TABLET, ORALLY DISINTEGRATING ORAL at 12:24

## 2021-05-12 RX ADMIN — OXYCODONE HYDROCHLORIDE AND ACETAMINOPHEN 1 TABLET: 5; 325 TABLET ORAL at 12:01

## 2021-05-12 RX ADMIN — PROPOFOL 125 MG: 10 INJECTION, EMULSION INTRAVENOUS at 08:56

## 2021-05-12 RX ADMIN — Medication 2 MG: at 10:54

## 2021-05-12 RX ADMIN — LIDOCAINE HYDROCHLORIDE 60 MG: 20 INJECTION, SOLUTION EPIDURAL; INFILTRATION; INTRACAUDAL; PERINEURAL at 08:56

## 2021-05-12 RX ADMIN — Medication 0.4 MG: at 10:54

## 2021-05-12 RX ADMIN — ROCURONIUM BROMIDE 50 MG: 10 INJECTION, SOLUTION INTRAVENOUS at 08:56

## 2021-05-12 RX ADMIN — SODIUM CHLORIDE, POTASSIUM CHLORIDE, SODIUM LACTATE AND CALCIUM CHLORIDE: 600; 310; 30; 20 INJECTION, SOLUTION INTRAVENOUS at 09:39

## 2021-05-12 RX ADMIN — CLINDAMYCIN PHOSPHATE 900 MG: 18 INJECTION, SOLUTION INTRAVENOUS at 09:08

## 2021-05-12 ASSESSMENT — PULMONARY FUNCTION TESTS
PIF_VALUE: 19
PIF_VALUE: 19
PIF_VALUE: 0
PIF_VALUE: 19
PIF_VALUE: 20
PIF_VALUE: 21
PIF_VALUE: 20
PIF_VALUE: 19
PIF_VALUE: 20
PIF_VALUE: 19
PIF_VALUE: 20
PIF_VALUE: 19
PIF_VALUE: 20
PIF_VALUE: 19
PIF_VALUE: 20
PIF_VALUE: 5
PIF_VALUE: 20
PIF_VALUE: 19
PIF_VALUE: 20
PIF_VALUE: 20
PIF_VALUE: 19
PIF_VALUE: 22
PIF_VALUE: 22
PIF_VALUE: 37
PIF_VALUE: 19
PIF_VALUE: 27
PIF_VALUE: 19
PIF_VALUE: 1
PIF_VALUE: 23
PIF_VALUE: 20
PIF_VALUE: 20
PIF_VALUE: 26
PIF_VALUE: 10
PIF_VALUE: 19
PIF_VALUE: 19
PIF_VALUE: 20
PIF_VALUE: 5
PIF_VALUE: 20
PIF_VALUE: 19
PIF_VALUE: 19
PIF_VALUE: 20
PIF_VALUE: 20
PIF_VALUE: 19
PIF_VALUE: 19
PIF_VALUE: 20
PIF_VALUE: 18
PIF_VALUE: 20
PIF_VALUE: 20
PIF_VALUE: 19
PIF_VALUE: 21
PIF_VALUE: 19
PIF_VALUE: 17
PIF_VALUE: 19
PIF_VALUE: 22
PIF_VALUE: 19
PIF_VALUE: 4
PIF_VALUE: 17
PIF_VALUE: 15
PIF_VALUE: 5
PIF_VALUE: 24
PIF_VALUE: 9
PIF_VALUE: 20
PIF_VALUE: 19
PIF_VALUE: 19
PIF_VALUE: 20
PIF_VALUE: 19

## 2021-05-12 ASSESSMENT — PAIN DESCRIPTION - PAIN TYPE
TYPE: SURGICAL PAIN
TYPE: SURGICAL PAIN

## 2021-05-12 ASSESSMENT — PAIN DESCRIPTION - LOCATION: LOCATION: NOSE

## 2021-05-12 ASSESSMENT — PAIN DESCRIPTION - DESCRIPTORS: DESCRIPTORS: BURNING

## 2021-05-12 ASSESSMENT — PAIN - FUNCTIONAL ASSESSMENT: PAIN_FUNCTIONAL_ASSESSMENT: 0-10

## 2021-05-12 ASSESSMENT — PAIN SCALES - GENERAL: PAINLEVEL_OUTOF10: 5

## 2021-05-12 ASSESSMENT — LIFESTYLE VARIABLES: SMOKING_STATUS: 1

## 2021-05-12 NOTE — ANESTHESIA PRE PROCEDURE
Department of Anesthesiology  Preprocedure Note       Name:  Ezequiel Homans   Age:  28 y.o.  :  1985                                          MRN:  [id number]         Date:  2021      Surgeon: Trent Rodriguez):  Terri Giron MD    Procedure: Procedure(s):  Bilateral Endoscopic Sinus surgery, Septoplasty, Bilateral Inferior turbinate reduction    Medications prior to admission:   Prior to Admission medications    Not on File       Current medications:    Current Facility-Administered Medications   Medication Dose Route Frequency Provider Last Rate Last Admin    sodium chloride flush 0.9 % injection 5-40 mL  5-40 mL Intravenous 2 times per day Terri Giron MD        sodium chloride flush 0.9 % injection 5-40 mL  5-40 mL Intravenous PRN Terri Giron MD        0.9 % sodium chloride infusion  25 mL Intravenous PRN Terri Giron MD        clindamycin (CLEOCIN) 900 mg in dextrose 5 % 50 mL IVPB  900 mg Intravenous On Call to [facility] Reyes Rios MD        lactated ringers infusion   Intravenous Continuous Terri Giron  mL/hr at 21 0819 New Bag at 21 0819       Allergies:     Allergies   Allergen Reactions    Penicillins Rash       Problem List:    Patient Active Problem List   Diagnosis Code    Hyperopia of both eyes with astigmatism H52.03, H52.203       Past Medical History:        Diagnosis Date    Allergic rhinitis     Anxiety and depression     Asthma     as a child, no recent symptoms    Gestational hypertension     history of    Hyperopia with astigmatism     Panic disorder     Tobacco use        Past Surgical History:        Procedure Laterality Date     SECTION, LOW TRANSVERSE  2006    Dr. Tia Barragan, [address]      dysfunctional uterine bleeding       Social History:    Social History     Tobacco Use    Smoking status: Current Every Day Smoker     Packs/day: 1.00     Years: 10.00     Pack years: 10.00    Smokeless tobacco: Never Used   Substance Use Topics    Alcohol use: No                                Ready to quit: Not Answered  Counseling given: Not Answered      Vital Signs (Current):   Vitals:    05/12/21 0750   BP: 107/70   Pulse: 78   Resp: 16   Temp: 36.8 °C (98.3 °F)   TempSrc: Temporal   SpO2: 99%   Weight: 104 lb (47.2 kg)   Height: 5' 2\" (1.575 m)                                              BP Readings from Last 3 Encounters:   05/12/21 107/70   03/26/21 118/76   01/26/21 118/84       NPO Status: Time of last liquid consumption: 2130                        Time of last solid consumption: 2000                        Date of last liquid consumption: 05/11/21                        Date of last solid food consumption: 05/11/21    BMI:   Wt Readings from Last 3 Encounters:   05/12/21 104 lb (47.2 kg)   03/26/21 105 lb (47.6 kg)   01/26/21 105 lb 3.2 oz (47.7 kg)     Body mass index is 19.02 kg/m². CBC:   Lab Results   Component Value Date    WBC 6.1 04/21/2021    RBC 4.07 04/21/2021    HGB 13.9 04/21/2021    HCT 41.5 04/21/2021    .0 04/21/2021    RDW 11.7 04/21/2021     04/21/2021       CMP:   Lab Results   Component Value Date     04/21/2021    K 4.2 04/21/2021     04/21/2021    CO2 28 04/21/2021    BUN 16 04/21/2021    CREATININE 0.54 04/21/2021    GFRAA >60 04/21/2021    LABGLOM >60 04/21/2021    GLUCOSE 78 04/21/2021    CALCIUM 9.6 04/21/2021       POC Tests: No results for input(s): POCGLU, POCNA, POCK, POCCL, POCBUN, POCHEMO, POCHCT in the last 72 hours.     Coags: No results found for: PROTIME, INR, APTT    HCG (If Applicable):   Lab Results   Component Value Date    HCG NEGATIVE 05/12/2021        ABGs: No results found for: PHART, PO2ART, VTZ5FDZ, AYL4VSL, BEART, J5XUKKSX     Type & Screen (If Applicable):  No results found for: LABABO, LABRH    Drug/Infectious Status (If Applicable):  No results found for: HIV, HEPCAB    COVID-19 Screening (If Applicable):   Lab Results   Component Value Date    COVID19 Not Detected 05/07/2021    COVID19 Not Detected 01/23/2021           Anesthesia Evaluation  Patient summary reviewed and Nursing notes reviewed no history of anesthetic complications:   Airway: Mallampati: II  TM distance: >3 FB   Neck ROM: full  Mouth opening: > = 3 FB Dental: normal exam         Pulmonary: breath sounds clear to auscultation  (+) asthma: current smoker          Patient did not smoke on day of surgery. Cardiovascular:  Exercise tolerance: good (>4 METS),       (-)  angina      Rhythm: regular  Rate: normal                    Neuro/Psych:   (+) depression/anxiety             GI/Hepatic/Renal: Neg GI/Hepatic/Renal ROS            Endo/Other: Negative Endo/Other ROS                    Abdominal:           Vascular: negative vascular ROS. Anesthesia Plan      general     ASA 2       Induction: intravenous. MIPS: Postoperative opioids intended and Prophylactic antiemetics administered. Anesthetic plan and risks discussed with patient.       Plan discussed with surgical team.                  BELGICA Melvin - ELEAZAR   5/12/2021

## 2021-05-12 NOTE — INTERVAL H&P NOTE
Update History & Physical    The patient's History and Physical of May 11, 2021 was reviewed with the patient and I examined the patient. There was no change. The surgical site was confirmed by the patient and me. Plan: The risks, benefits, expected outcome, and alternative to the recommended procedure have been discussed with the patient. Patient understands and wants to proceed with the procedure.      Electronically signed by Edgar Amado MD on 5/12/2021 at 8:33 AM

## 2021-05-12 NOTE — OP NOTE
Operative Note      Patient: Tameka Cam  YOB: 1985  MRN: [id number]    Date of Procedure: 5/12/2021    Pre-Op Diagnosis: sinusitis, deviated septum, turbinate hypertrophy, non intractable headache,  sinus pressure, chronic pansinusitis    Post-Op Diagnosis: Same       Procedure(s):  Bilateral Endoscopic Sinus surgery, Septoplasty, Bilateral Inferior turbinate reduction    Surgeon(s):  Yari Payton MD    Assistant:   * No surgical staff found *    Anesthesia: General    Estimated Blood Loss (mL): 229     Complications: None    Specimens:   * No specimens in log *    Implants:  * No implants in log *      Drains: * No LDAs found *    Findings: right deviated septum, bilateral turbinate hypertrophy    Detailed Description of Procedure:   Patient correctly identified in pre op holding. Taken to OR and placed supine. Placed under general anesthesia. Bed rotated 90 degrees to the patient's right. Patient prepped and draped in usual sterile fashion. Time out performed. Nasal cavity injected with 1% lidocaine with 1:100,000 epinephrine. Afrin soaked pledgets placed into nasal cavities. Nostrils swabbed with betadine. TransferWise navigation system positioned and registered at the head of bed. Landmarks confirmed for accuracy of registration at glabella, nasal tip, and lateral canthus. Zero degree endoscope used to performed bilateral nasal endoscopy. Findings above confirmed. 15 blade used to make left hemitransfixion incison. Cristofer Steffany used to identify submucoperichondrial plane. Submucoperichondrial flaps elevated posteriorly to the bony cartilage junction. Cristofer Steffany used to separate the bony cartilage septal junction. Waldo used to isolate the cartilage and bony deviation and removed. Flaps repositioned. Hemitransfixion incision closed with running 4-0 chromic suture. Bilateral flaps replaced and secured with mattress 4-0 plain gut on behzad needle.     Frontal sinus balloon placed into right frontal recess and into sinus on navigation. Balloon inflated twice. Same procedure performed on the left. The right ethmoid bulla and uncinate process were identified with landmarks and navigation. The right middle turbinate was medialized. A back biter was placed into the right ethmoid infundibulum and the inferior uncinate process was removed with cutting instruments. The microdebrider was used to complete the uncinectomy using navigation. The natural maxillary ostia was identified with the 30 degree scope and further opened with the microdebrider. The same procedure was performed on the left. The sinus seeker was used to infracture the right anterior ethmoid bulla. The remainder of the ethmoid bulla was opened with the microdebrider and through cutting instruments. The same procedure was performed on the left. Sphenoid sinus balloon placed into sphenoid sinus ostia using navigation and inflated twice. Same procedure performed on the left. 15 blade used to make vertical mucosal incision on anterior head of right inferior turbinate. [place] used to elevate submucoperiosteal flaps posteriorly on the medial edge of turbinate. 2mm microdebrider used to perform submucosal resection of the turbinate. Same procedure performed on the left inferior turbinate. Turbinates outfractured. Straight suction used to clean both cavities once more to confirm hemostasis. Chavez splint with bacitracin placed into the right nasal cavity and secured with a 2-0 nylon suture at caudal septum. Orogastric tube used to suction gastric contents. Tolerated procedure well. Extubated without complications. Taken to pacu in stable condition.        Electronically signed by Edgar Amado MD on 5/12/2021 at 11:12 AM

## 2021-05-12 NOTE — ANESTHESIA POSTPROCEDURE EVALUATION
Department of Anesthesiology  Postprocedure Note    Patient: Cassandra Blanco  MRN: [id number]  [place]: 1985  Date of evaluation: 5/12/2021  Time:  11:29 AM     Procedure Summary     Date: 05/12/21 Room / Location: [address] [place] [address]    Anesthesia Start: 7501 Anesthesia Stop: 1110    Procedure: Bilateral Endoscopic Sinus surgery, Septoplasty, Bilateral Inferior turbinate reduction (N/A ) Diagnosis: (sinusitis, deviated septum, turbinate hypertrophy, non intractable headache,  sinus pressure, chronic pansinusitis)    Surgeons: Dorian Brown MD Responsible Provider: BELGICA Archer CRNA    Anesthesia Type: general ASA Status: 2          Anesthesia Type: general    Amarjit Phase I: Amarjit Score: 9    Amarjit Phase II:      Last vitals: Reviewed and per EMR flowsheets.        Anesthesia Post Evaluation    Patient location during evaluation: PACU  Patient participation: complete - patient participated  Level of consciousness: awake and alert  Pain score: 2  Airway patency: patent  Nausea & Vomiting: no nausea and no vomiting  Complications: no  Cardiovascular status: blood pressure returned to baseline and hemodynamically stable  Respiratory status: acceptable, spontaneous ventilation and room air  Hydration status: euvolemic

## 2021-05-19 ENCOUNTER — OFFICE VISIT (OUTPATIENT)
Dept: OTOLARYNGOLOGY | Age: 36
End: 2021-05-19
Payer: MEDICARE

## 2021-05-19 VITALS
BODY MASS INDEX: 18.99 KG/M2 | SYSTOLIC BLOOD PRESSURE: 106 MMHG | WEIGHT: 103.2 LBS | OXYGEN SATURATION: 98 % | DIASTOLIC BLOOD PRESSURE: 76 MMHG | HEIGHT: 62 IN | HEART RATE: 98 BPM

## 2021-05-19 DIAGNOSIS — Z98.890 S/P NASAL SEPTOPLASTY: ICD-10-CM

## 2021-05-19 DIAGNOSIS — Z98.890 S/P FESS (FUNCTIONAL ENDOSCOPIC SINUS SURGERY): Primary | ICD-10-CM

## 2021-05-19 PROCEDURE — 4004F PT TOBACCO SCREEN RCVD TLK: CPT | Performed by: OTOLARYNGOLOGY

## 2021-05-19 PROCEDURE — G8420 CALC BMI NORM PARAMETERS: HCPCS | Performed by: OTOLARYNGOLOGY

## 2021-05-19 PROCEDURE — 99214 OFFICE O/P EST MOD 30 MIN: CPT | Performed by: OTOLARYNGOLOGY

## 2021-05-19 PROCEDURE — 99024 POSTOP FOLLOW-UP VISIT: CPT | Performed by: OTOLARYNGOLOGY

## 2021-05-19 PROCEDURE — G8427 DOCREV CUR MEDS BY ELIG CLIN: HCPCS | Performed by: OTOLARYNGOLOGY

## 2021-05-19 PROCEDURE — 31237 NSL/SINS NDSC SURG BX POLYPC: CPT | Performed by: OTOLARYNGOLOGY

## 2021-05-19 RX ORDER — ACETAMINOPHEN 500 MG
500 TABLET ORAL EVERY 6 HOURS PRN
COMMUNITY

## 2021-05-19 NOTE — PROGRESS NOTES
2021 9:24 AM IRON Chang (:  1985) is a 28 y.o. female,New patient, here for evaluation of the following chief complaint(s):  Post-Op Check (1 week- post op- sinus surgery - septoplasty)      ASSESSMENT/PLAN:  1. S/P FESS (functional endoscopic sinus surgery)    2. S/P nasal septoplasty      1. S/P FESS (functional endoscopic sinus surgery)  2. S/P nasal septoplasty    Continue maria g med sinus rinse  Ok to liberalize activities   Fu in 3 weeks     Return in about 3 weeks (around 2021). SUBJECTIVE/OBJECTIVE:  HPI  63-year-old woman with chronic sinusitis. She endorses a strong childhood history of allergies. She also has a strong family history of allergies. They improved as she grew older and last year she would take a Claritin only as needed. She endorses 1-2 episodes of sinus infection versus upper respiratory infections a year. More recently over the last few months she has been on 3 courses of antibiotics starting with a Z-Eulogio and then Bactrim, Flonase, antihistamine and currently is on Levaquin, prednisone. Her current symptoms include facial pain and pressure, worse on left side, bilateral nasal obstruction, green nasal drainage, denies change in smell. She has had minimal improvement in her left-sided facial pressure, the most prominent feature. She denies vision changes. Endorses some dryness to the front of her nose. Her current episode started with copious sinus drainage. She also experienced left facial swelling but that has improved on prednisone. She has completed 3-week course of antibiotics but continues to have headaches and sinus pressure. He also endorses some nasal obstruction. CT sinus performed 2021 at [facility]:  FINDINGS:            The paranasal sinuses are paired and well-developed bilaterally.            There is moderate chronic right and mild chronic left maxillary sinusitis.           There is mild mucosal thickening in the ethmoid sinuses spray.  The endoscope was inserted and advanced through the bilateral side of the nose examining the mucosa and nasal structures. Right:  Inferior turbinate normal, middle meatus with minimal crusting and excess mucus, gently suctioned and debrided, no polyps or purulence  Left:  Inferior turbinate normal, middle meatus with minimal crusting and excess mucus, gently suctioned and debrided, no polyps or purulence  More edema on the left nasal cavity side      An electronic signature was used to authenticate this note.     --Dorian Brown MD     5/19/2021 9:24 AM EST

## 2021-05-27 ENCOUNTER — TELEPHONE (OUTPATIENT)
Dept: OTOLARYNGOLOGY | Age: 36
End: 2021-05-27

## 2021-05-27 NOTE — TELEPHONE ENCOUNTER
Patient reports she has a lot of drainage in her throat and nasal congestion. She had a septoplasty on 5/12/2021 and was instructed not to blow her nose after the surgery. She is wondering if she could use a bulb syringe to suck some of the mucus out and if Dr. Brittani Bazan could prescribe her a medication to dry up the mucus. Please call Maureen Aldridge back at [phone].

## 2021-06-07 ENCOUNTER — OFFICE VISIT (OUTPATIENT)
Dept: OTOLARYNGOLOGY | Age: 36
End: 2021-06-07
Payer: MEDICARE

## 2021-06-07 VITALS
SYSTOLIC BLOOD PRESSURE: 104 MMHG | OXYGEN SATURATION: 98 % | WEIGHT: 103.6 LBS | BODY MASS INDEX: 19.06 KG/M2 | DIASTOLIC BLOOD PRESSURE: 72 MMHG | HEIGHT: 62 IN | HEART RATE: 82 BPM

## 2021-06-07 DIAGNOSIS — R09.81 NASAL CONGESTION: ICD-10-CM

## 2021-06-07 DIAGNOSIS — Z98.890 S/P FESS (FUNCTIONAL ENDOSCOPIC SINUS SURGERY): Primary | ICD-10-CM

## 2021-06-07 DIAGNOSIS — Z98.890 S/P NASAL SEPTOPLASTY: ICD-10-CM

## 2021-06-07 PROCEDURE — 31231 NASAL ENDOSCOPY DX: CPT | Performed by: OTOLARYNGOLOGY

## 2021-06-07 PROCEDURE — G8420 CALC BMI NORM PARAMETERS: HCPCS | Performed by: OTOLARYNGOLOGY

## 2021-06-07 PROCEDURE — 99024 POSTOP FOLLOW-UP VISIT: CPT | Performed by: OTOLARYNGOLOGY

## 2021-06-07 PROCEDURE — 4004F PT TOBACCO SCREEN RCVD TLK: CPT | Performed by: OTOLARYNGOLOGY

## 2021-06-07 PROCEDURE — G8427 DOCREV CUR MEDS BY ELIG CLIN: HCPCS | Performed by: OTOLARYNGOLOGY

## 2021-06-07 PROCEDURE — 99214 OFFICE O/P EST MOD 30 MIN: CPT | Performed by: OTOLARYNGOLOGY

## 2021-06-07 RX ORDER — FLUTICASONE PROPIONATE 50 MCG
2 SPRAY, SUSPENSION (ML) NASAL DAILY
Qty: 3 BOTTLE | Refills: 3 | Status: SHIPPED | OUTPATIENT
Start: 2021-06-07

## 2021-06-07 NOTE — PROGRESS NOTES
2021 9:24 AM IRON Palma (:  1985) is a 28 y.o. female,New patient, here for evaluation of the following chief complaint(s):  Follow-up (1 mo fu post septoplasy, pt states cant breath out left side and cant get anything out of left side, says it feels blocked and still has some post nasal drip)      ASSESSMENT/PLAN:  1. S/P FESS (functional endoscopic sinus surgery)    2. S/P nasal septoplasty    3. Nasal congestion      1. S/P FESS (functional endoscopic sinus surgery)  2. S/P nasal septoplasty  3. Nasal congestion    Continue maria g med sinus rinse  Add flonase nasal spray    Fu in 2 months for 3 month post op     Return in about 2 months (around 2021). SUBJECTIVE/OBJECTIVE:  HPI  19-year-old woman with chronic sinusitis. She endorses a strong childhood history of allergies. She also has a strong family history of allergies. They improved as she grew older and last year she would take a Claritin only as needed. She endorses 1-2 episodes of sinus infection versus upper respiratory infections a year. More recently over the last few months she has been on 3 courses of antibiotics starting with a Z-Eulogio and then Bactrim, Flonase, antihistamine and currently is on Levaquin, prednisone. Her current symptoms include facial pain and pressure, worse on left side, bilateral nasal obstruction, green nasal drainage, denies change in smell. She has had minimal improvement in her left-sided facial pressure, the most prominent feature. She denies vision changes. Endorses some dryness to the front of her nose. Her current episode started with copious sinus drainage. She also experienced left facial swelling but that has improved on prednisone. She has completed 3-week course of antibiotics but continues to have headaches and sinus pressure. He also endorses some nasal obstruction.     CT sinus performed 2021 at [facility]:  FINDINGS:            The paranasal sinuses are paired and well-developed bilaterally.            There is moderate chronic right and mild chronic left maxillary sinusitis.          There is mild mucosal thickening in the ethmoid sinuses and there is opacification of the left    sphenoid sinus.  The frontal sinuses are clear.          No air fluid levels, soft tissue masses or osseous erosions.          No mastoid effusions.          The regional bones are intact.            IMPRESSION:     1.  Multifocal sinusitis.       Today she is 1 month s/p endoscopic sinus surgery (sphenoid balloon, frontal balloon, maxillary, anterior ethmoid), septoplasty, turbinate reduction 5/17/21. Has done well. Some congestion in the left side of the nose. Feels like she has a crust that won't come out. Has been using the rinse. Review of Systems  ENT ROS: positive for - sinus pain    General: The patient is found to be alert and normally responsive female with grossly normal hearing, clear voice and normal articulation. Communication is without difficulty. Voice: Clear   Skin: The skin has normal colour and turgor. Face: The facial contour is symmetric at rest and with movement. Ears: The pinnae have normal contours. AD: EAC clear, TM intact, no effusion/erythema/retraction   AS: EAC clear, TM intact, no effusion/erythema/retraction   Eye: The ocular movements are full and symmetric, the conjunctiva is unremarkable; sclera are anicteric, pupillary response is symmetric. No nystagmus is found. Nose:   The external nasal contour is normal  The nasal mucosa is pink, healing well   the nasal septum is straight. The turbinates are healed  The nares are patent without evidence of polyposis   Oral cavity:   The dentition is healthy. The oral mucosa is without lesions;  the tongue is symmetric with full mobility and is without fasciculation. The soft palate is symmetric. The oropharynx is unremarkable.   Neck: The neck has a normal contour; no masses are found on palpation    Fiberoptic examination of the upper airway using flexible scope was conducted after first applying topical phenylephrine (1%) and lidocaine (4%) to the bilateral nasal cavity by spray. The endoscope was inserted and advanced through the bilateral side of the nose examining the mucosa and nasal structures. Right:  Inferior turbinate normal, patent maxillary ostia, middle meatus clear, thick mucus around the inferior turbinate, no polyps or purulence  Left:  Inferior turbinate normal, patent maxillary ostia, middle meatus clear, no polyps or purulence    An electronic signature was used to authenticate this note.     --Edmundo Allen MD     6/7/2021 9:24 AM EST

## 2021-06-10 ENCOUNTER — OFFICE VISIT (OUTPATIENT)
Dept: FAMILY MEDICINE CLINIC | Age: 36
End: 2021-06-10
Payer: MEDICARE

## 2021-06-10 VITALS
HEIGHT: 62 IN | BODY MASS INDEX: 19.14 KG/M2 | HEART RATE: 85 BPM | OXYGEN SATURATION: 98 % | DIASTOLIC BLOOD PRESSURE: 76 MMHG | RESPIRATION RATE: 20 BRPM | WEIGHT: 104 LBS | SYSTOLIC BLOOD PRESSURE: 110 MMHG | TEMPERATURE: 98.6 F

## 2021-06-10 DIAGNOSIS — L73.9 FOLLICULITIS: Primary | ICD-10-CM

## 2021-06-10 PROCEDURE — 99211 OFF/OP EST MAY X REQ PHY/QHP: CPT | Performed by: NURSE PRACTITIONER

## 2021-06-10 PROCEDURE — 4004F PT TOBACCO SCREEN RCVD TLK: CPT | Performed by: NURSE PRACTITIONER

## 2021-06-10 PROCEDURE — G8420 CALC BMI NORM PARAMETERS: HCPCS | Performed by: NURSE PRACTITIONER

## 2021-06-10 PROCEDURE — 99213 OFFICE O/P EST LOW 20 MIN: CPT | Performed by: NURSE PRACTITIONER

## 2021-06-10 PROCEDURE — G8427 DOCREV CUR MEDS BY ELIG CLIN: HCPCS | Performed by: NURSE PRACTITIONER

## 2021-06-10 RX ORDER — CEPHALEXIN 500 MG/1
500 CAPSULE ORAL 3 TIMES DAILY
Qty: 21 CAPSULE | Refills: 0 | Status: SHIPPED | OUTPATIENT
Start: 2021-06-10 | End: 2021-06-17

## 2021-06-10 SDOH — ECONOMIC STABILITY: FOOD INSECURITY: WITHIN THE PAST 12 MONTHS, THE FOOD YOU BOUGHT JUST DIDN'T LAST AND YOU DIDN'T HAVE MONEY TO GET MORE.: NEVER TRUE

## 2021-06-10 SDOH — ECONOMIC STABILITY: FOOD INSECURITY: WITHIN THE PAST 12 MONTHS, YOU WORRIED THAT YOUR FOOD WOULD RUN OUT BEFORE YOU GOT MONEY TO BUY MORE.: NEVER TRUE

## 2021-06-10 ASSESSMENT — SOCIAL DETERMINANTS OF HEALTH (SDOH): HOW HARD IS IT FOR YOU TO PAY FOR THE VERY BASICS LIKE FOOD, HOUSING, MEDICAL CARE, AND HEATING?: NOT HARD AT ALL

## 2021-06-10 ASSESSMENT — ENCOUNTER SYMPTOMS
SHORTNESS OF BREATH: 0
COUGH: 0
SORE THROAT: 0

## 2021-06-10 NOTE — PROGRESS NOTES
[address], APRN-CNP  [address]  Phone:  [phone]  Fax:  [phone]  Alyce Barcenas is a 28 y.o. female who presents today for her medical conditions/complaints as noted below. Alyce Barcenas c/o of Bite (bites or rash? ? stomach and back and arms started less then a week ago, started with one and now its spreading )    Has swimming pool. No hot tub. No itching or pain. HPI:     Rash  This is a new problem. The current episode started in the past 7 days (almost a week). The problem has been gradually worsening since onset. The affected locations include the back, torso, abdomen and neck. The rash is characterized by redness and swelling. She was exposed to nothing. Pertinent negatives include no cough, fatigue, fever, shortness of breath or sore throat. Treatments tried: nail polish. The treatment provided no relief.        Wt Readings from Last 3 Encounters:   06/10/21 104 lb (47.2 kg)   21 103 lb 9.6 oz (47 kg)   21 103 lb 3.2 oz (46.8 kg)       Temp Readings from Last 3 Encounters:   06/10/21 98.6 °F (37 °C)   21 97.3 °F (36.3 °C) (Temporal)   21 97.4 °F (36.3 °C)       BP Readings from Last 3 Encounters:   06/10/21 110/76   21 104/72   21 106/76       Pulse Readings from Last 3 Encounters:   06/10/21 85   21 82   21 98              Past Medical History:   Diagnosis Date    Allergic rhinitis     Anxiety and depression     Asthma     as a child, no recent symptoms    Gestational hypertension     history of    Hyperopia with astigmatism     Panic disorder     Tobacco use       Past Surgical History:   Procedure Laterality Date     SECTION, LOW TRANSVERSE  2006    Dr. Cleo Bedoya, [address]  2009    dysfunctional uterine bleeding    SINUS ENDOSCOPY N/A 2021    Bilateral Endoscopic Sinus surgery, Septoplasty, Bilateral Inferior turbinate reduction performed by Akil Cook MD at [facility] [address] History   Problem Relation Age of Onset    Cancer Mother    Harmeet Monet Migraines Mother     Depression Mother     Cancer Father         unsure of type    Asthma Son     Glaucoma Neg Hx     Cataracts Neg Hx     Diabetes Neg Hx      Social History     Tobacco Use    Smoking status: Current Every Day Smoker     Packs/day: 1.00     Years: 10.00     Pack years: 10.00    Smokeless tobacco: Never Used   Substance Use Topics    Alcohol use: No      Current Outpatient Medications   Medication Sig Dispense Refill    cephALEXin (KEFLEX) 500 MG capsule Take 1 capsule by mouth 3 times daily for 7 days 21 capsule 0    fluticasone (FLONASE) 50 MCG/ACT nasal spray 2 sprays by Each Nostril route daily 3 Bottle 3    acetaminophen (TYLENOL) 500 MG tablet Take 500 mg by mouth every 6 hours as needed for Pain (Patient not taking: Reported on 6/10/2021)      ondansetron (ZOFRAN-ODT) 4 MG disintegrating tablet Take 1 tablet by mouth 3 times daily as needed for Nausea or Vomiting (Patient not taking: Reported on 6/10/2021) 10 tablet 0     No current facility-administered medications for this visit. Allergies   Allergen Reactions    Penicillins Rash       No exam data present    Subjective:      Review of Systems   Constitutional: Negative for fatigue and fever. HENT: Negative for sore throat. Respiratory: Negative for cough and shortness of breath. Skin: Positive for rash. Objective:     /76 (Site: Left Upper Arm, Position: Sitting, Cuff Size: Large Adult)   Pulse 85   Temp 98.6 °F (37 °C)   Resp 20   Ht 5' 2\" (1.575 m)   Wt 104 lb (47.2 kg)   SpO2 98%   BMI 19.02 kg/m²     Physical Exam  Vitals reviewed. Pulmonary:      Effort: Pulmonary effort is normal.   Skin:     General: Skin is warm and dry. Capillary Refill: Capillary refill takes less than 2 seconds. Findings: Rash present. Rash is macular, papular and pustular. Neurological:      Mental Status: She is alert. Assessment:      Diagnosis Orders   1. Folliculitis  cephALEXin (KEFLEX) 500 MG capsule     No results found for this visit on 06/10/21. Plan:       Keflex as directed. Follow up with primary care provider in 1 to 2 days if needed. Patient Instructions     Keflex as directed. Follow up with primary care provider in 1 to 2 days if needed. Patient Education        Folliculitis: Care Instructions  Overview     Folliculitis (say \"wkc-TWD-zyn-LY-tus\") is an inflammation of the pouches (follicles) in the skin where hair grows. It can occur on any part of the body, but it is most common on the scalp, face, armpits, and groin. Bacteria, such as those found in a hot tub, can cause folliculitis. But folliculitis can also be caused by other organisms, such as fungi or parasites. Folliculitis begins as a red, tender area near a strand of hair. The skin can itch or burn and may drain pus or blood. Sometimes folliculitis can lead to more serious skin infections. Your doctor usually can treat mild folliculitis with an antibiotic cream or ointment. If you have folliculitis on your scalp, you may use a medicated shampoo. Antibiotics you take as pills can treat infections deeper in the skin. Other treatments that may be used include antifungal and antiparasitic medicines. Folliculitis may be caused by ingrown hairs from shaving. One solution is to stop shaving. If that isn't an option, using an electric razor that doesn't shave so close may help. Laser treatment may also be an option. Laser treatment destroys the hair follicle so hair will no longer grow in the treated area. Follow-up care is a key part of your treatment and safety. Be sure to make and go to all appointments, and call your doctor if you are having problems. It's also a good idea to know your test results and keep a list of the medicines you take.   How can you care for yourself at home?  · Take your medicine exactly as prescribed. If your doctor prescribed antibiotics, take them as directed. Do not stop taking them just because you feel better. You need to take the full course of antibiotics. · To help with itching or pain, put a warm, moist cloth (like a clean washcloth) on the area for 5 to 10 minutes, 3 to 6 times a day. · Do not share your razor, towel, or washcloth. That can spread folliculitis. · If folliculitis is caused by shaving, try to avoid shaving for at least a month. If that isn't an option, use an electric razor that doesn't shave so close. Or if you need a clean shave, use shaving cream or gel and always shave in the direction that the hair grows. When should you call for help? Call your doctor now or seek immediate medical care if:    · You have symptoms of infection, such as:  ? Increased pain, swelling, warmth, or redness. ? Red streaks leading from the area. ? Pus draining from the area. ? A fever. Watch closely for changes in your health, and be sure to contact your doctor if:    · You do not get better as expected. Where can you learn more? Go to https://Medify.One [employer] One Ads. org and sign in to your [employer] account. Enter M257 in the [facility] box to learn more about \"Folliculitis: Care Instructions. \"     If you do not have an account, please click on the \"Sign Up Now\" link. Current as of: July 2, 2020               Content Version: 12.8  © 2006-2021 Healthwise, Incorporated. Care instructions adapted under license by [facility] ([facility]). If you have questions about a medical condition or this instruction, always ask your healthcare professional. [place] [address] any warranty or liability for your use of this information. Patient/Caregiver instructed on use, benefit, and side effects of prescribed medications. All patient/parent/caregiver questions answered. Patient/parent/caregiver voiced understanding. Reviewed health maintenance. Instructed to continue current medications, diet and exercise. Patient agreed with treatment plan. Follow up as directed.            Electronically signed by BELGICA Groves NP on6/10/2021

## 2021-06-10 NOTE — PATIENT INSTRUCTIONS
Keflex as directed. Follow up with primary care provider in 1 to 2 days if needed. Patient Education        Folliculitis: Care Instructions  Overview     Folliculitis (say \"wqy-OHL-tgr-LY-tus\") is an inflammation of the pouches (follicles) in the skin where hair grows. It can occur on any part of the body, but it is most common on the scalp, face, armpits, and groin. Bacteria, such as those found in a hot tub, can cause folliculitis. But folliculitis can also be caused by other organisms, such as fungi or parasites. Folliculitis begins as a red, tender area near a strand of hair. The skin can itch or burn and may drain pus or blood. Sometimes folliculitis can lead to more serious skin infections. Your doctor usually can treat mild folliculitis with an antibiotic cream or ointment. If you have folliculitis on your scalp, you may use a medicated shampoo. Antibiotics you take as pills can treat infections deeper in the skin. Other treatments that may be used include antifungal and antiparasitic medicines. Folliculitis may be caused by ingrown hairs from shaving. One solution is to stop shaving. If that isn't an option, using an electric razor that doesn't shave so close may help. Laser treatment may also be an option. Laser treatment destroys the hair follicle so hair will no longer grow in the treated area. Follow-up care is a key part of your treatment and safety. Be sure to make and go to all appointments, and call your doctor if you are having problems. It's also a good idea to know your test results and keep a list of the medicines you take. How can you care for yourself at home? · Take your medicine exactly as prescribed. If your doctor prescribed antibiotics, take them as directed. Do not stop taking them just because you feel better. You need to take the full course of antibiotics.   · To help with itching or pain, put a warm, moist cloth (like a clean washcloth) on the area for 5 to 10 minutes, 3 to 6 times a day. · Do not share your razor, towel, or washcloth. That can spread folliculitis. · If folliculitis is caused by shaving, try to avoid shaving for at least a month. If that isn't an option, use an electric razor that doesn't shave so close. Or if you need a clean shave, use shaving cream or gel and always shave in the direction that the hair grows. When should you call for help? Call your doctor now or seek immediate medical care if:    · You have symptoms of infection, such as:  ? Increased pain, swelling, warmth, or redness. ? Red streaks leading from the area. ? Pus draining from the area. ? A fever. Watch closely for changes in your health, and be sure to contact your doctor if:    · You do not get better as expected. Where can you learn more? Go to https://[employer].Brighter.com. org and sign in to your [employer] account. Enter M257 in the [employer] box to learn more about \"Folliculitis: Care Instructions. \"     If you do not have an account, please click on the \"Sign Up Now\" link. Current as of: July 2, 2020               Content Version: 12.8  © 2006-2021 Healthwise, Incorporated. Care instructions adapted under license by [facility] ([facility]). If you have questions about a medical condition or this instruction, always ask your healthcare professional. [place] [address] any warranty or liability for your use of this information.

## 2021-06-14 ENCOUNTER — OFFICE VISIT (OUTPATIENT)
Dept: FAMILY MEDICINE CLINIC | Age: 36
End: 2021-06-14
Payer: MEDICARE

## 2021-06-14 VITALS
BODY MASS INDEX: 18.95 KG/M2 | HEIGHT: 62 IN | OXYGEN SATURATION: 97 % | SYSTOLIC BLOOD PRESSURE: 110 MMHG | DIASTOLIC BLOOD PRESSURE: 76 MMHG | WEIGHT: 103 LBS | HEART RATE: 86 BPM

## 2021-06-14 DIAGNOSIS — L42 PITYRIASIS ROSEA: Primary | ICD-10-CM

## 2021-06-14 PROCEDURE — 99213 OFFICE O/P EST LOW 20 MIN: CPT | Performed by: NURSE PRACTITIONER

## 2021-06-14 PROCEDURE — G8420 CALC BMI NORM PARAMETERS: HCPCS | Performed by: NURSE PRACTITIONER

## 2021-06-14 PROCEDURE — G8427 DOCREV CUR MEDS BY ELIG CLIN: HCPCS | Performed by: NURSE PRACTITIONER

## 2021-06-14 PROCEDURE — 4004F PT TOBACCO SCREEN RCVD TLK: CPT | Performed by: NURSE PRACTITIONER

## 2021-06-14 PROCEDURE — 99211 OFF/OP EST MAY X REQ PHY/QHP: CPT | Performed by: NURSE PRACTITIONER

## 2021-06-14 RX ORDER — TRIAMCINOLONE ACETONIDE 1 MG/G
CREAM TOPICAL 3 TIMES DAILY
Qty: 80 G | Refills: 0 | Status: SHIPPED | OUTPATIENT
Start: 2021-06-14 | End: 2021-07-12

## 2021-06-14 RX ORDER — METHYLPREDNISOLONE 4 MG/1
TABLET ORAL
Qty: 1 KIT | Refills: 0 | Status: SHIPPED | OUTPATIENT
Start: 2021-06-14 | End: 2021-06-20

## 2021-06-14 ASSESSMENT — ENCOUNTER SYMPTOMS
TROUBLE SWALLOWING: 0
WHEEZING: 0

## 2021-06-14 NOTE — PROGRESS NOTES
SURGICAL HISTORY     Patient  has a past surgical history that includes  section, low transverse (2006); Endometrial ablation (); and Sinus endoscopy (N/A, 2021). CURRENT MEDICATIONS       Outpatient Medications Prior to Visit   Medication Sig Dispense Refill    cephALEXin (KEFLEX) 500 MG capsule Take 1 capsule by mouth 3 times daily for 7 days 21 capsule 0    fluticasone (FLONASE) 50 MCG/ACT nasal spray 2 sprays by Each Nostril route daily 3 Bottle 3    acetaminophen (TYLENOL) 500 MG tablet Take 500 mg by mouth every 6 hours as needed for Pain (Patient not taking: Reported on 6/10/2021)      ondansetron (ZOFRAN-ODT) 4 MG disintegrating tablet Take 1 tablet by mouth 3 times daily as needed for Nausea or Vomiting (Patient not taking: Reported on 6/10/2021) 10 tablet 0     No facility-administered medications prior to visit. ALLERGIES     Patient is is allergic to penicillins. FAMILY HISTORY     Patient's family history includes Asthma in her son; Cancer in her father and mother; Depression in her mother; Migraines in her mother. SOCIAL HISTORY     Patient  reports that she has been smoking. She has a 10.00 pack-year smoking history. She has never used smokeless tobacco. She reports that she does not drink alcohol and does not use drugs. PHYSICAL EXAM     VITALS  BP: 110/76,  , Pulse: 86,  , SpO2: 97 %  Physical Exam  Vitals reviewed. Constitutional:       General: She is not in acute distress. Appearance: Normal appearance. Cardiovascular:      Heart sounds: Normal heart sounds. Pulmonary:      Effort: Pulmonary effort is normal. No respiratory distress. Breath sounds: Normal breath sounds. No wheezing. Musculoskeletal:      Cervical back: Normal range of motion and neck supple. Lymphadenopathy:      Cervical: No cervical adenopathy. Skin:     General: Skin is warm and dry. Capillary Refill: Capillary refill takes less than 2 seconds. Findings: Rash present. Comments: 2 cm pink oval lesion noted on medial aspect of right upper arm; lesion is dry, slight scaling noted. Multiple smaller (0.5-1cm) pink lesions noted scattered on bilateral medial upper arms, slightly up left side of neck, across chest, abdomen, back. A few scattered lesions noted on proximal top of bilateral thighs. Lesions are slightly raised. Few lesions noted to be peeling. No drainage or fluctuance noted throughout. Neurological:      Mental Status: She is alert. DIAGNOSTIC RESULTS   Labs:No results found for this visit on 06/14/21. IMAGING:        CLINICAL COURSE:     Vitals:    06/14/21 0901   BP: 110/76   Site: Left Upper Arm   Position: Sitting   Cuff Size: Large Adult   Pulse: 86   SpO2: 97%   Weight: 103 lb (46.7 kg)   Height: 5' 2\" (1.575 m)           PROCEDURES:  None  FINAL IMPRESSION      1. Pityriasis rosea         DISPOSITION/PLAN     Hx, symptoms, and presentation are consistent with pityriasis rosea. Discussed with pt the dx and expected course. Will send in short course of steroids for pt itching. Advised pt to return to clinic should symptoms worsen. Patient Instructions     Will start you on short course of steroids and send in topical steroid cream for itching. Can use cool compresses as well if itching becomes bothersome. If rash worsens over the next week, please return to clinic for further evaluation. Patient Education        Pityriasis Rosea: Care Instructions  Your Care Instructions     Pityriasis rosea (say \"pit-uh-RY-uh-ruma KAREN-zee-uh\") is a common skin rash. It usually starts as one scaly, reddish-pink spot on your stomach or back. Days or weeks later, more spots appear. The rash may itch, but it will not spread to other people. No one knows what causes pityriasis rosea. Some doctors believe it is a reaction to a virus. Pityriasis rosea is most common in children and young adults.  It lasts 1 to 3 months and then goes away on its own. Medicine can help relieve any itching. Follow-up care is a key part of your treatment and safety. Be sure to make and go to all appointments, and call your doctor if you are having problems. It's also a good idea to know your test results and keep a list of the medicines you take. How can you care for yourself at home? · Use your medicine exactly as prescribed. Call your doctor if you have any problems with your medicine. · Expose your skin to small amounts of sunlight, but avoid sunburn. Sunlight can lessen the rash. · Use a mild soap, such as Dove or Cetaphil, when you wash your skin. · Add a handful of oatmeal (ground to a powder) to your bath. Or you can try an oatmeal bath product, such as Aveeno. Keep the water warm or lukewarm. A hot bath or shower may make the rash more visible and itchy. · Use an over-the-counter 1% hydrocortisone cream for small itchy areas. When should you call for help? Call your doctor now or seek immediate medical care if:    · You have signs of infection such as:  ? Pain, warmth, or swelling near the rash. ? Red streaks near the rash. ? Pus coming from the rash. ? A fever. Watch closely for changes in your health, and be sure to contact your doctor if:    · You see the rash on the palms of your hands or the soles of your feet.     · You do not get better as expected. Where can you learn more? Go to https://ORDISSIMOpeNSL Renewable Powereb.JiaThis. org and sign in to your [employer] account. Enter S327 in the [facility] box to learn more about \"Pityriasis Rosea: Care Instructions. \"     If you do not have an account, please click on the \"Sign Up Now\" link. Current as of: July 2, 2020               Content Version: 12.8  © 7815-6600 Healthwise, Incorporated. Care instructions adapted under license by [facility] ([facility]).  If you have questions about a medical condition or this instruction, always ask your healthcare professional. Hailey Lentz

## 2021-06-14 NOTE — PATIENT INSTRUCTIONS
Will start you on short course of steroids and send in topical steroid cream for itching. Can use cool compresses as well if itching becomes bothersome. If rash worsens over the next week, please return to clinic for further evaluation. Patient Education        Pityriasis Rosea: Care Instructions  Your Care Instructions     Pityriasis rosea (say \"pit-uh-RY-uh-ruma KAREN-zee-uh\") is a common skin rash. It usually starts as one scaly, reddish-pink spot on your stomach or back. Days or weeks later, more spots appear. The rash may itch, but it will not spread to other people. No one knows what causes pityriasis rosea. Some doctors believe it is a reaction to a virus. Pityriasis rosea is most common in children and young adults. It lasts 1 to 3 months and then goes away on its own. Medicine can help relieve any itching. Follow-up care is a key part of your treatment and safety. Be sure to make and go to all appointments, and call your doctor if you are having problems. It's also a good idea to know your test results and keep a list of the medicines you take. How can you care for yourself at home? · Use your medicine exactly as prescribed. Call your doctor if you have any problems with your medicine. · Expose your skin to small amounts of sunlight, but avoid sunburn. Sunlight can lessen the rash. · Use a mild soap, such as Dove or Cetaphil, when you wash your skin. · Add a handful of oatmeal (ground to a powder) to your bath. Or you can try an oatmeal bath product, such as Aveeno. Keep the water warm or lukewarm. A hot bath or shower may make the rash more visible and itchy. · Use an over-the-counter 1% hydrocortisone cream for small itchy areas. When should you call for help? Call your doctor now or seek immediate medical care if:    · You have signs of infection such as:  ? Pain, warmth, or swelling near the rash. ? Red streaks near the rash. ? Pus coming from the rash. ? A fever.    Watch closely for changes in your health, and be sure to contact your doctor if:    · You see the rash on the palms of your hands or the soles of your feet.     · You do not get better as expected. Where can you learn more? Go to https://chpeshaaneweb.Optimus3. org and sign in to your EVR[employer] account. Enter S327 in the [employer] box to learn more about \"Pityriasis Rosea: Care Instructions. \"     If you do not have an account, please click on the \"Sign Up Now\" link. Current as of: July 2, 2020               Content Version: 12.8  © 6545-0275 Healthwise, Incorporated. Care instructions adapted under license by [facility] ([facility]). If you have questions about a medical condition or this instruction, always ask your healthcare professional. [address] any warranty or liability for your use of this information.

## 2021-07-12 ENCOUNTER — OFFICE VISIT (OUTPATIENT)
Dept: FAMILY MEDICINE CLINIC | Age: 36
End: 2021-07-12
Payer: MEDICARE

## 2021-07-12 VITALS
OXYGEN SATURATION: 99 % | DIASTOLIC BLOOD PRESSURE: 70 MMHG | BODY MASS INDEX: 19.2 KG/M2 | SYSTOLIC BLOOD PRESSURE: 118 MMHG | HEART RATE: 104 BPM | WEIGHT: 105 LBS

## 2021-07-12 DIAGNOSIS — L03.90 CELLULITIS, UNSPECIFIED CELLULITIS SITE: ICD-10-CM

## 2021-07-12 DIAGNOSIS — L28.2 PRURITIC RASH: ICD-10-CM

## 2021-07-12 DIAGNOSIS — M75.91 LESION OF RIGHT SHOULDER: Primary | ICD-10-CM

## 2021-07-12 PROCEDURE — 99214 OFFICE O/P EST MOD 30 MIN: CPT | Performed by: INTERNAL MEDICINE

## 2021-07-12 PROCEDURE — G8427 DOCREV CUR MEDS BY ELIG CLIN: HCPCS | Performed by: INTERNAL MEDICINE

## 2021-07-12 PROCEDURE — G8420 CALC BMI NORM PARAMETERS: HCPCS | Performed by: INTERNAL MEDICINE

## 2021-07-12 PROCEDURE — 4004F PT TOBACCO SCREEN RCVD TLK: CPT | Performed by: INTERNAL MEDICINE

## 2021-07-12 RX ORDER — CLOTRIMAZOLE AND BETAMETHASONE DIPROPIONATE 10; .64 MG/G; MG/G
CREAM TOPICAL
Qty: 1 TUBE | Refills: 0 | Status: SHIPPED | OUTPATIENT
Start: 2021-07-12

## 2021-07-12 RX ORDER — HYDROXYZINE HYDROCHLORIDE 25 MG/1
25 TABLET, FILM COATED ORAL EVERY 8 HOURS PRN
Qty: 30 TABLET | Refills: 0 | Status: SHIPPED | OUTPATIENT
Start: 2021-07-12 | End: 2021-07-22

## 2021-07-12 RX ORDER — DOXYCYCLINE HYCLATE 100 MG
100 TABLET ORAL 2 TIMES DAILY
Qty: 14 TABLET | Refills: 0 | Status: SHIPPED | OUTPATIENT
Start: 2021-07-12 | End: 2021-07-19

## 2021-07-12 NOTE — PROGRESS NOTES
[facility]  [address] [address] [place] [address]  Dept: [phone]  Dept Fax: [phone]  Loc: [phone]     Visit Date:  2021    Patient:  Dalia Brooks  YOB: 1985    HPI:   Dalia Brooks presents today for   Chief Complaint   Patient presents with    Rash     patient was tubing this weekend and started getting a rash on her right shoulder . it is red and hot to touch. it has 2 black dots on the rash. Trevin Camargo HPI 66-year-old female is coming in today with a rash that she noticed on her right shoulder. its been red hot hot,swollen and very itchy. .  Patient but she went tubing this past weekend to [facility] and she did end up falling into the river had no injuries did not had any open wounds or ulcers . but 1 day after she started noticing a rash on her right shoulder with 2 black dots surrounded by raised erythematous looking patches and she has been itching. No other rashes. Medications  Prior to Visit Medications    Medication Sig Taking? Authorizing Provider   fluticasone (FLONASE) 50 MCG/ACT nasal spray 2 sprays by Each Nostril route daily Yes Natalia Carr MD   acetaminophen (TYLENOL) 500 MG tablet Take 500 mg by mouth every 6 hours as needed for Pain  Yes Historical Provider, MD        Allergies:  is allergic to penicillins. Past Medical History:   has a past medical history of Allergic rhinitis, Anxiety and depression, Asthma, Gestational hypertension, Hyperopia with astigmatism, Panic disorder, and Tobacco use. Past Surgical History   has a past surgical history that includes  section, low transverse (2006); Endometrial ablation (); and Sinus endoscopy (N/A, 2021). Family History  family history includes Asthma in her son; Cancer in her father and mother; Depression in her mother; Migraines in her mother.     Social History   reports that she has been smoking. She has a 10.00 pack-year smoking history. She has never used smokeless tobacco. She reports that she does not drink alcohol and does not use drugs. Health Maintenance:    Health Maintenance   Topic Date Due    Hepatitis C screen  Never done    Varicella vaccine (1 of 2 - 2-dose childhood series) Never done    Pneumococcal 0-64 years Vaccine (1 of 2 - PPSV23) Never done    COVID-19 Vaccine (1) Never done    HIV screen  Never done    DTaP/Tdap/Td vaccine (1 - Tdap) 05/03/2006    Cervical cancer screen  Never done    Flu vaccine (1) 09/01/2021    Hepatitis A vaccine  Aged Out    Hepatitis B vaccine  Aged Out    Hib vaccine  Aged Out    Meningococcal (ACWY) vaccine  Aged Out       Subjective:      Review of Systems   Skin: Positive for rash. Objective:     /70 (Site: Right Upper Arm, Position: Sitting)   Pulse 104   Wt 105 lb (47.6 kg)   SpO2 99%   BMI 19.20 kg/m²     Physical Exam  Vitals and nursing note reviewed. HENT:      Head: Normocephalic and atraumatic. Cardiovascular:      Rate and Rhythm: Normal rate and regular rhythm. Pulmonary:      Breath sounds: No stridor. Skin:     General: Skin is warm. Findings: Rash present. Comments: Right shoulder - erythematous raised looking patch with two black dots in the middle. Neurological:      Mental Status: She is oriented to person, place, and time. Mental status is at baseline. Psychiatric:         Mood and Affect: Mood normal.             Assessment       Diagnosis Orders   1. Lesion of right shoulder  doxycycline hyclate (VIBRA-TABS) 100 MG tablet    clotrimazole-betamethasone (LOTRISONE) 1-0.05 % cream    hydrOXYzine (ATARAX) 25 MG tablet   2. Cellulitis, unspecified cellulitis site  doxycycline hyclate (VIBRA-TABS) 100 MG tablet    clotrimazole-betamethasone (LOTRISONE) 1-0.05 % cream    hydrOXYzine (ATARAX) 25 MG tablet   3.  Pruritic rash  doxycycline hyclate (VIBRA-TABS) 100 MG tablet clotrimazole-betamethasone (LOTRISONE) 1-0.05 % cream    hydrOXYzine (ATARAX) 25 MG tablet         PLAN   Likely swimmers itch?or cercarial dermatitis- anit itch lotion/cool compresses, atarax, lotrisone to see if that would help with oral antibiotics. If no relief please see us back. No orders of the defined types were placed in this encounter. No follow-ups on file. Patient given educational materials - see patient instructions. Discussed use, benefit, and side effects of prescribed medications. All patient questions answered. Pt voiced understanding. Reviewed health maintenance.        Electronically signed Jasvir Pelletier MD on 7/12/2021 at 1:32 PM EDT

## 2021-08-09 ENCOUNTER — OFFICE VISIT (OUTPATIENT)
Dept: OTOLARYNGOLOGY | Age: 36
End: 2021-08-09
Payer: MEDICARE

## 2021-08-09 VITALS
SYSTOLIC BLOOD PRESSURE: 110 MMHG | WEIGHT: 104.6 LBS | HEART RATE: 77 BPM | DIASTOLIC BLOOD PRESSURE: 78 MMHG | HEIGHT: 62 IN | OXYGEN SATURATION: 99 % | BODY MASS INDEX: 19.25 KG/M2

## 2021-08-09 DIAGNOSIS — Z98.890 S/P FESS (FUNCTIONAL ENDOSCOPIC SINUS SURGERY): Primary | ICD-10-CM

## 2021-08-09 DIAGNOSIS — R09.81 NASAL CONGESTION: ICD-10-CM

## 2021-08-09 DIAGNOSIS — Z98.890 S/P NASAL SEPTOPLASTY: ICD-10-CM

## 2021-08-09 PROCEDURE — G8420 CALC BMI NORM PARAMETERS: HCPCS | Performed by: OTOLARYNGOLOGY

## 2021-08-09 PROCEDURE — 4004F PT TOBACCO SCREEN RCVD TLK: CPT | Performed by: OTOLARYNGOLOGY

## 2021-08-09 PROCEDURE — 99213 OFFICE O/P EST LOW 20 MIN: CPT | Performed by: OTOLARYNGOLOGY

## 2021-08-09 PROCEDURE — G8427 DOCREV CUR MEDS BY ELIG CLIN: HCPCS | Performed by: OTOLARYNGOLOGY

## 2021-08-09 PROCEDURE — 31231 NASAL ENDOSCOPY DX: CPT | Performed by: OTOLARYNGOLOGY

## 2021-08-09 PROCEDURE — 99214 OFFICE O/P EST MOD 30 MIN: CPT | Performed by: OTOLARYNGOLOGY

## 2021-08-09 RX ORDER — AZELASTINE 1 MG/ML
2 SPRAY, METERED NASAL 2 TIMES DAILY
Qty: 1 BOTTLE | Refills: 3 | Status: SHIPPED | OUTPATIENT
Start: 2021-08-09 | End: 2022-10-12

## 2021-08-09 RX ORDER — HYDROXYZINE HYDROCHLORIDE 25 MG/1
TABLET, FILM COATED ORAL
COMMUNITY
Start: 2021-08-04 | End: 2022-10-12 | Stop reason: SDUPTHER

## 2021-09-08 ENCOUNTER — OFFICE VISIT (OUTPATIENT)
Dept: FAMILY MEDICINE CLINIC | Age: 36
End: 2021-09-08
Payer: MEDICARE

## 2021-09-08 VITALS
RESPIRATION RATE: 20 BRPM | DIASTOLIC BLOOD PRESSURE: 72 MMHG | WEIGHT: 104 LBS | BODY MASS INDEX: 19.14 KG/M2 | HEIGHT: 62 IN | SYSTOLIC BLOOD PRESSURE: 110 MMHG | HEART RATE: 91 BPM | OXYGEN SATURATION: 97 %

## 2021-09-08 DIAGNOSIS — Z23 NEED FOR TDAP VACCINATION: ICD-10-CM

## 2021-09-08 DIAGNOSIS — F17.200 SMOKER: ICD-10-CM

## 2021-09-08 DIAGNOSIS — F17.210 CIGARETTE NICOTINE DEPENDENCE WITHOUT COMPLICATION: ICD-10-CM

## 2021-09-08 DIAGNOSIS — Z00.00 WELL ADULT EXAM: Primary | ICD-10-CM

## 2021-09-08 PROCEDURE — 90715 TDAP VACCINE 7 YRS/> IM: CPT | Performed by: NURSE PRACTITIONER

## 2021-09-08 PROCEDURE — 99213 OFFICE O/P EST LOW 20 MIN: CPT | Performed by: NURSE PRACTITIONER

## 2021-09-08 PROCEDURE — 99395 PREV VISIT EST AGE 18-39: CPT | Performed by: NURSE PRACTITIONER

## 2021-09-08 RX ORDER — NICOTINE 21 MG/24HR
1 PATCH, TRANSDERMAL 24 HOURS TRANSDERMAL DAILY
Qty: 42 PATCH | Refills: 0 | Status: SHIPPED | OUTPATIENT
Start: 2021-09-08 | End: 2022-01-04

## 2021-09-08 ASSESSMENT — ENCOUNTER SYMPTOMS: RESPIRATORY NEGATIVE: 1

## 2021-09-08 NOTE — PROGRESS NOTES
2021    Susu Rabago (:  1985) is a 28 y.o. female, here for a preventive medicine evaluation. Patient Active Problem List   Diagnosis    Hyperopia of both eyes with astigmatism   Patient presents today for a physical for a new job. She will be working at a . Recent sinus surgery and states she is doing well. She is a smoker and thinking about quitting. Review of Systems   Constitutional: Negative. HENT: Negative. Respiratory: Negative. Cardiovascular: Negative. Genitourinary: Negative. Musculoskeletal: Negative. Neurological: Negative. Psychiatric/Behavioral: Negative. Prior to Visit Medications    Medication Sig Taking? Authorizing Provider   azelastine (ASTELIN) 0.1 % nasal spray 2 sprays by Nasal route 2 times daily Use in each nostril as directed Yes Kanwal Colunga MD   fluticasone (FLONASE) 50 MCG/ACT nasal spray 2 sprays by Each Nostril route daily Yes Kanwal Colunga MD   acetaminophen (TYLENOL) 500 MG tablet Take 500 mg by mouth every 6 hours as needed for Pain  Yes Historical Provider, MD   hydrOXYzine (ATARAX) 25 MG tablet   Historical Provider, MD   clotrimazole-betamethasone (LOTRISONE) 1-0.05 % cream Apply topically 2 times daily.   Patient not taking: Reported on 2021  Dom Rodriguez MD        Allergies   Allergen Reactions    Penicillins Rash       Past Medical History:   Diagnosis Date    Allergic rhinitis     Anxiety and depression     Asthma     as a child, no recent symptoms    Gestational hypertension     history of    Hyperopia with astigmatism     Panic disorder     Tobacco use        Past Surgical History:   Procedure Laterality Date     SECTION, LOW TRANSVERSE  2006    Dr. Bolivar [address]      dysfunctional uterine bleeding    SINUS ENDOSCOPY N/A 2021    Bilateral Endoscopic Sinus surgery, Septoplasty, Bilateral Inferior turbinate reduction performed by Tala Gomez MD at [facility]       Social History     Socioeconomic History    Marital status:      Spouse name: Not on file    Number of children: Not on file    Years of education: Not on file    Highest education level: Not on file   Occupational History    Not on file   Tobacco Use    Smoking status: Current Every Day Smoker     Packs/day: 1.00     Years: 10.00     Pack years: 10.00    Smokeless tobacco: Never Used   Vaping Use    Vaping Use: Never used   Substance and Sexual Activity    Alcohol use: No    Drug use: No    Sexual activity: Not Currently     Partners: Male   Other Topics Concern    Not on file   Social History Narrative    Not on file     Social Determinants of Health     Financial Resource Strain: Low Risk     Difficulty of Paying Living Expenses: Not hard at all   Food Insecurity: No Food Insecurity    Worried About Running Out of Food in the Last Year: Never true    Delfin of Food in the Last Year: Never true   Transportation Needs:     Lack of Transportation (Medical):      Lack of Transportation (Non-Medical):    Physical Activity:     Days of Exercise per Week:     Minutes of Exercise per Session:    Stress:     Feeling of Stress :    Social Connections:     Frequency of Communication with Friends and Family:     Frequency of Social Gatherings with Friends and Family:     Attends Anabaptist Services:     Active Member of Clubs or Organizations:     Attends Club or Organization Meetings:     Marital Status:    Intimate Partner Violence:     Fear of Current or Ex-Partner:     Emotionally Abused:     Physically Abused:     Sexually Abused:         Family History   Problem Relation Age of Onset    Cancer Mother     Migraines Mother     Depression Mother     Cancer Father         unsure of type    Asthma Son     Glaucoma Neg Hx     Cataracts Neg Hx     Diabetes Neg Hx        ADVANCE DIRECTIVE: N, <no information>    Vitals:    09/08/21 1040   BP: 110/72   Site: Left Upper Arm   Position: Sitting   Cuff Size: Large Adult   Pulse: 91   Resp: 20   SpO2: 97%   Weight: 104 lb (47.2 kg)   Height: 5' 2\" (1.575 m)     Estimated body mass index is 19.02 kg/m² as calculated from the following:    Height as of this encounter: 5' 2\" (1.575 m). Weight as of this encounter: 104 lb (47.2 kg). Physical Exam  Vitals and nursing note reviewed. Constitutional:       Appearance: Normal appearance. She is normal weight. HENT:      Head: Normocephalic and atraumatic. Mouth/Throat:      Mouth: Mucous membranes are moist.      Pharynx: Oropharynx is clear. Eyes:      Conjunctiva/sclera: Conjunctivae normal.   Cardiovascular:      Rate and Rhythm: Normal rate and regular rhythm. Pulmonary:      Effort: Pulmonary effort is normal.      Breath sounds: Normal breath sounds. Abdominal:      General: Abdomen is flat. Bowel sounds are normal.      Palpations: Abdomen is soft. Skin:     General: Skin is warm and dry. Neurological:      General: No focal deficit present. Mental Status: She is alert and oriented to person, place, and time. Psychiatric:         Mood and Affect: Mood normal.         Behavior: Behavior normal.         Thought Content: Thought content normal.         No flowsheet data found. Lab Results   Component Value Date    GLUCOSE 78 04/21/2021       The ASCVD Risk score (Camille Graves., et al., 2013) failed to calculate for the following reasons:     The 2013 ASCVD risk score is only valid for ages 36 to 78    Immunization History   Administered Date(s) Administered    MMR 04/28/1998    Td (Adult), 5 Lf Tetanus Toxoid, Pf (Tenivac, Decavac) 05/02/2006       Health Maintenance   Topic Date Due    Hepatitis C screen  Never done    Varicella vaccine (1 of 2 - 2-dose childhood series) Never done    Pneumococcal 0-64 years Vaccine (1 of 2 - PPSV23) Never done    COVID-19 Vaccine (1) Never done    HIV screen  Never done    DTaP/Tdap/Td vaccine (1 - Tdap) 05/03/2006    Cervical cancer screen  Never done    Flu vaccine (1) Never done    Hepatitis A vaccine  Aged Out    Hepatitis B vaccine  Aged Out    Hib vaccine  Aged Out    Meningococcal (ACWY) vaccine  Aged Out          ASSESSMENT/PLAN:  1. Well adult exam  -Patient should get up to date on her women's health exam  2. Need for Tdap vaccination  -     Tdap (age 6y and older) IM ([address])  3. Cigarette nicotine dependence without complication  -     nicotine (NICODERM CQ) 21 MG/24HR; Place 1 patch onto the skin daily, Disp-42 patch, R-0Normal  4. Smoker  -     nicotine (NICODERM CQ) 21 MG/24HR; Place 1 patch onto the skin daily, Disp-42 patch, R-0Normal      Return in about 1 year (around 9/8/2022), or if symptoms worsen or fail to improve. An electronic signature was used to authenticate this note.     --Fly Prado, BELGICA - CNP on 9/8/2021 at 10:48 AM

## 2021-09-08 NOTE — PATIENT INSTRUCTIONS
Patient Education        Deciding About Using Medicines To Quit Smoking  How can you decide about using medicines to quit smoking? What are the medicines you can use? Your doctor may prescribe varenicline (Chantix) or bupropion (Zyban). These medicines can help you cope with cravings for tobacco. They are pills that don't contain nicotine. You also can use nicotine replacement products. These do contain nicotine. There are many types. · Gum and lozenges slowly release nicotine into your mouth. · Patches stick to your skin. They slowly release nicotine into your bloodstream.  · An inhaler has a mora that contains nicotine. You breathe in a puff of nicotine vapor through your mouth and throat. · Nasal spray releases a mist that contains nicotine. What are key points about this decision? · Using medicines can double your chances of quitting smoking. They can ease cravings and withdrawal symptoms. · Getting counseling along with using medicine can raise your chances of quitting even more. · If you smoke fewer than 5 cigarettes a day, you may not need medicines to help you quit smoking. · These medicines have less nicotine than cigarettes. And by itself, nicotine is not nearly as harmful as smoking. The tars, carbon monoxide, and other toxic chemicals in tobacco cause the harmful effects. · The side effects of nicotine replacement products depend on the type of product. For example, a patch can make your skin red and itchy. Medicines in pill form can make you sick to your stomach. They can also cause dry mouth and trouble sleeping. For most people, the side effects are not bad enough to make them stop using the products. Why might you choose to use medicines to quit smoking? · You have tried on your own to stop smoking, but you were not able to stop. · You smoke more than 5 cigarettes a day. · You want to increase your chances of quitting smoking.   · You want to reduce your cravings and withdrawal symptoms. · You feel the benefits of medicine outweigh the side effects. Why might you choose not to use medicine? · You want to try quitting on your own by stopping all at once (\"cold turkey\"). · You want to cut back slowly on the number of cigarettes you smoke. · You smoke fewer than 5 cigarettes a day. · You do not like using medicine. · You feel the side effects of medicines outweigh the benefits. · You are worried about the cost of medicines. Your decision  Thinking about the facts and your feelings can help you make a decision that is right for you. Be sure you understand the benefits and risks of your options, and think about what else you need to do before you make the decision. Where can you learn more? Go to https://CITTIO.ROX Medical. org and sign in to your Splendia account. Enter N182 in the Snow [employer] Alps box to learn more about \"Deciding About Using Medicines To Quit Smoking. \"     If you do not have an account, please click on the \"Sign Up Now\" link. Current as of: February 11, 2021               Content Version: 12.9  © 3112-0028 [employer]ZESEEK[employer]. Care instructions adapted under license by [facility] ([facility]). If you have questions about a medical condition or this instruction, always ask your healthcare professional. [place] [address] any warranty or liability for your use of this information. Patient Education        Stopping Smoking: Care Instructions  Your Care Instructions     Cigarette smokers crave the nicotine in cigarettes. Giving it up is much harder than simply changing a habit. Your body has to stop craving the nicotine. It is hard to quit, but you can do it. There are many tools that people use to quit smoking. You may find that combining tools works best for you. There are several steps to quitting. First you get ready to quit. Then you get support to help you.  After that, you learn new skills and behaviors to become a nonsmoker. For many people, a necessary step is getting and using medicine. Your doctor will help you set up the plan that best meets your needs. You may want to attend a smoking cessation program to help you quit smoking. When you choose a program, look for one that has proven success. Ask your doctor for ideas. You will greatly increase your chances of success if you take medicine as well as get counseling or join a cessation program.  Some of the changes you feel when you first quit tobacco are uncomfortable. Your body will miss the nicotine at first, and you may feel short-tempered and grumpy. You may have trouble sleeping or concentrating. Medicine can help you deal with these symptoms. You may struggle with changing your smoking habits and rituals. The last step is the tricky one: Be prepared for the smoking urge to continue for a time. This is a lot to deal with, but keep at it. You will feel better. Follow-up care is a key part of your treatment and safety. Be sure to make and go to all appointments, and call your doctor if you are having problems. It's also a good idea to know your test results and keep a list of the medicines you take. How can you care for yourself at home? · Ask your family, friends, and coworkers for support. You have a better chance of quitting if you have help and support. · Join a support group, such as Nicotine Anonymous, for people who are trying to quit smoking. · Consider signing up for a smoking cessation program, such as the American Lung Association's Freedom from Smoking program.  · Get text messaging support. Go to the website at www.smokefree. gov to sign up for the [facility] program.  · Set a quit date. Pick your date carefully so that it is not right in the middle of a big deadline or stressful time. Once you quit, do not even take a puff. Get rid of all ashtrays and lighters after your last cigarette.  Clean your house and your clothes so that they do not smell of smoke.  · Learn how to be a nonsmoker. Think about ways you can avoid those things that make you reach for a cigarette. ? Avoid situations that put you at greatest risk for smoking. For some people, it is hard to have a drink with friends without smoking. For others, they might skip a coffee break with coworkers who smoke. ? Change your daily routine. Take a different route to work or eat a meal in a different place. · Cut down on stress. Calm yourself or release tension by doing an activity you enjoy, such as reading a book, taking a hot bath, or gardening. · Talk to your doctor or pharmacist about nicotine replacement therapy, which replaces the nicotine in your body. You still get nicotine but you do not use tobacco. Nicotine replacement products help you slowly reduce the amount of nicotine you need. These products come in several forms, many of them available over-the-counter:  ? Nicotine patches  ? Nicotine gum and lozenges  ? Nicotine inhaler  · Ask your doctor about bupropion (Wellbutrin) or varenicline (Chantix), which are prescription medicines. They do not contain nicotine. They help you by reducing withdrawal symptoms, such as stress and anxiety. · Some people find hypnosis, acupuncture, and massage helpful for ending the smoking habit. · Eat a healthy diet and get regular exercise. Having healthy habits will help your body move past its craving for nicotine. · Be prepared to keep trying. Most people are not successful the first few times they try to quit. Do not get mad at yourself if you smoke again. Make a list of things you learned and think about when you want to try again, such as next week, next month, or next year. Where can you learn more? Go to https://reid.Fi.tt. org and sign in to your [employer] account. Enter F721 in the Automattic box to learn more about \"Stopping Smoking: Care Instructions. \"     If you do not have an account, please click on the \"Sign Up Now\" link. Current as of: February 11, 2021               Content Version: 12.9  © 5764-2868 Healthwise, Incorporated. Care instructions adapted under license by [facility] ([facility]). If you have questions about a medical condition or this instruction, always ask your healthcare professional. [address] any warranty or liability for your use of this information.

## 2021-10-12 ENCOUNTER — HOSPITAL ENCOUNTER (OUTPATIENT)
Age: 36
Setting detail: SPECIMEN
Discharge: HOME | End: 2021-10-12
Payer: MEDICARE

## 2021-10-12 ENCOUNTER — OFFICE VISIT (OUTPATIENT)
Dept: FAMILY MEDICINE CLINIC | Age: 36
End: 2021-10-12
Payer: MEDICARE

## 2021-10-12 VITALS
BODY MASS INDEX: 20.24 KG/M2 | OXYGEN SATURATION: 99 % | DIASTOLIC BLOOD PRESSURE: 86 MMHG | SYSTOLIC BLOOD PRESSURE: 110 MMHG | HEIGHT: 62 IN | WEIGHT: 110 LBS | HEART RATE: 87 BPM

## 2021-10-12 DIAGNOSIS — J40 BRONCHITIS: ICD-10-CM

## 2021-10-12 DIAGNOSIS — J40 BRONCHITIS: Primary | ICD-10-CM

## 2021-10-12 PROCEDURE — 99212 OFFICE O/P EST SF 10 MIN: CPT | Performed by: NURSE PRACTITIONER

## 2021-10-12 PROCEDURE — 99213 OFFICE O/P EST LOW 20 MIN: CPT | Performed by: NURSE PRACTITIONER

## 2021-10-12 PROCEDURE — 99212 OFFICE O/P EST SF 10 MIN: CPT

## 2021-10-12 PROCEDURE — U0005 INFEC AGEN DETEC AMPLI PROBE: HCPCS

## 2021-10-12 PROCEDURE — U0003 INFECTIOUS AGENT DETECTION BY NUCLEIC ACID (DNA OR RNA); SEVERE ACUTE RESPIRATORY SYNDROME CORONAVIRUS 2 (SARS-COV-2) (CORONAVIRUS DISEASE [COVID-19]), AMPLIFIED PROBE TECHNIQUE, MAKING USE OF HIGH THROUGHPUT TECHNOLOGIES AS DESCRIBED BY CMS-2020-01-R: HCPCS

## 2021-10-12 RX ORDER — DEXTROMETHORPHAN HYDROBROMIDE AND PROMETHAZINE HYDROCHLORIDE 15; 6.25 MG/5ML; MG/5ML
5 SYRUP ORAL 4 TIMES DAILY PRN
Qty: 180 ML | Refills: 0 | Status: SHIPPED | OUTPATIENT
Start: 2021-10-12 | End: 2021-10-19

## 2021-10-12 RX ORDER — ONDANSETRON 4 MG/1
4 TABLET, ORALLY DISINTEGRATING ORAL 3 TIMES DAILY PRN
Qty: 21 TABLET | Refills: 0 | Status: SHIPPED | OUTPATIENT
Start: 2021-10-12 | End: 2022-10-12

## 2021-10-12 RX ORDER — DEXAMETHASONE 6 MG/1
12 TABLET ORAL ONCE
Qty: 2 TABLET | Refills: 0 | Status: SHIPPED | OUTPATIENT
Start: 2021-10-12 | End: 2021-10-12

## 2021-10-12 RX ORDER — AZITHROMYCIN 250 MG/1
250 TABLET, FILM COATED ORAL SEE ADMIN INSTRUCTIONS
Qty: 6 TABLET | Refills: 0 | Status: SHIPPED | OUTPATIENT
Start: 2021-10-12 | End: 2021-10-17

## 2021-10-12 ASSESSMENT — ENCOUNTER SYMPTOMS
NAUSEA: 1
VOMITING: 1
RHINORRHEA: 1
WHEEZING: 1
CONSTIPATION: 0
SORE THROAT: 1
DIARRHEA: 0
SHORTNESS OF BREATH: 0
COUGH: 1

## 2021-10-12 NOTE — PROGRESS NOTES
Subjective:      Patient ID: Derrek Gonzalez is a 28 y.o. female coming in for   Chief Complaint   Patient presents with    Nausea & Vomiting      pt states heart burn, chest discomfort, cough,  fatigue, nausea, and vomiting. s/s started a week ago. Cough  This is a new problem. Episode onset: symptoms began with sneezing/rhinorrhea on 10/5/21 and have progressed into chest congestion/cough. The problem has been gradually worsening. The cough is non-productive. Associated symptoms include chest pain, chills, nasal congestion, rhinorrhea, a sore throat and wheezing. Pertinent negatives include no fever, rash or shortness of breath. Associated symptoms comments: Denies loss of taste/smell. Review of Systems   Constitutional: Positive for chills. Negative for fever. HENT: Positive for rhinorrhea and sore throat. Respiratory: Positive for cough and wheezing. Negative for shortness of breath. Cardiovascular: Positive for chest pain. Gastrointestinal: Positive for nausea and vomiting. Negative for constipation and diarrhea. Skin: Negative for rash. Objective:  /86 (Site: Right Upper Arm, Position: Sitting, Cuff Size: Small Adult)   Pulse 87   Ht 5' 2\" (1.575 m)   Wt 110 lb (49.9 kg)   SpO2 99%   BMI 20.12 kg/m²      Physical Exam  Vitals and nursing note reviewed. Constitutional:       General: She is not in acute distress. Appearance: Normal appearance. She is not ill-appearing, toxic-appearing or diaphoretic. HENT:      Head: Normocephalic. Nose: Congestion and rhinorrhea present. Mouth/Throat:      Mouth: Mucous membranes are moist.      Pharynx: Oropharynx is clear. Cardiovascular:      Rate and Rhythm: Normal rate and regular rhythm. Heart sounds: Normal heart sounds. Pulmonary:      Effort: Pulmonary effort is normal. No respiratory distress. Breath sounds: Normal breath sounds. No stridor. No wheezing, rhonchi or rales.    Abdominal: General: Bowel sounds are normal. There is no distension. Palpations: Abdomen is soft. Tenderness: There is no abdominal tenderness. Musculoskeletal:      Cervical back: Neck supple. No tenderness. Right lower leg: No edema. Left lower leg: No edema. Lymphadenopathy:      Cervical: No cervical adenopathy. Skin:     General: Skin is warm and dry. Findings: No rash. Neurological:      General: No focal deficit present. Mental Status: She is alert and oriented to person, place, and time. Assessment:      1. Bronchitis           Plan:    Meds as directed. Call PCP if symptoms worsen/persist     Orders Placed This Encounter   Procedures    COVID-19     Standing Status:   Future     Standing Expiration Date:   10/12/2022     Scheduling Instructions:      1) Due to current limited availability of the COVID-19 test, tests will be prioritized based on responses to questions above. Testing may be delayed due to volume. 2) Print and instruct patient to adhere to CDC home isolation program. (Link Above)              3) Set up or refer patient for a monitoring program.              4) Have patient sign up for and leverage MyChart (if not previously done). Order Specific Question:   Is this test for diagnosis or screening? Answer:   Diagnosis of ill patient     Order Specific Question:   Symptomatic for COVID-19 as defined by CDC? Answer:   Yes     Order Specific Question:   Date of Symptom Onset     Answer:   10/5/2021     Order Specific Question:   Hospitalized for COVID-19? Answer:   No     Order Specific Question:   Admitted to ICU for COVID-19? Answer:   No     Order Specific Question:   Employed in healthcare setting? Answer:   No     Order Specific Question:   Resident in a congregate (group) care setting? Answer:   No     Order Specific Question:   Pregnant? Answer:   No     Order Specific Question:   Previously tested for COVID-19? Answer:   Yes      Outpatient Encounter Medications as of 10/12/2021   Medication Sig Dispense Refill    azithromycin (ZITHROMAX) 250 MG tablet Take 1 tablet by mouth See Admin Instructions for 5 days 500mg on day 1 followed by 250mg on days 2 - 5 6 tablet 0    dexamethasone (DECADRON) 6 MG tablet Take 2 tablets by mouth once for 1 dose 2 tablet 0    promethazine-dextromethorphan (PROMETHAZINE-DM) 6.25-15 MG/5ML syrup Take 5 mLs by mouth 4 times daily as needed for Cough 180 mL 0    ondansetron (ZOFRAN-ODT) 4 MG disintegrating tablet Take 1 tablet by mouth 3 times daily as needed for Nausea or Vomiting 21 tablet 0    nicotine (NICODERM CQ) 21 MG/24HR Place 1 patch onto the skin daily 42 patch 0    azelastine (ASTELIN) 0.1 % nasal spray 2 sprays by Nasal route 2 times daily Use in each nostril as directed 1 Bottle 3    fluticasone (FLONASE) 50 MCG/ACT nasal spray 2 sprays by Each Nostril route daily 3 Bottle 3    acetaminophen (TYLENOL) 500 MG tablet Take 500 mg by mouth every 6 hours as needed for Pain       hydrOXYzine (ATARAX) 25 MG tablet  (Patient not taking: Reported on 9/8/2021)      clotrimazole-betamethasone (LOTRISONE) 1-0.05 % cream Apply topically 2 times daily. (Patient not taking: Reported on 8/9/2021) 1 Tube 0     No facility-administered encounter medications on file as of 10/12/2021.             Bacilio Cardenas, BELGICA - CNP

## 2021-10-12 NOTE — PATIENT INSTRUCTIONS
Patient Education        Bronchitis: Care Instructions  Your Care Instructions     Bronchitis is inflammation of the bronchial tubes, which carry air to the lungs. The tubes swell and produce mucus, or phlegm. The mucus and inflamed bronchial tubes make you cough. You may have trouble breathing. Most cases of bronchitis are caused by viruses like those that cause colds. Antibiotics usually do not help and they may be harmful. Bronchitis usually develops rapidly and lasts about 2 to 3 weeks in otherwise healthy people. Follow-up care is a key part of your treatment and safety. Be sure to make and go to all appointments, and call your doctor if you are having problems. It's also a good idea to know your test results and keep a list of the medicines you take. How can you care for yourself at home? · Take all medicines exactly as prescribed. Call your doctor if you think you are having a problem with your medicine. · Get some extra rest.  · Take an over-the-counter pain medicine, such as acetaminophen (Tylenol), ibuprofen (Advil, Motrin), or naproxen (Aleve) to reduce fever and relieve body aches. Read and follow all instructions on the label. · Do not take two or more pain medicines at the same time unless the doctor told you to. Many pain medicines have acetaminophen, which is Tylenol. Too much acetaminophen (Tylenol) can be harmful. · Take an over-the-counter cough medicine to help quiet a dry, hacking cough so that you can sleep. Avoid cough medicines that have more than one active ingredient. Read and follow all instructions on the label. · Do not smoke. Smoking can make bronchitis worse. If you need help quitting, talk to your doctor about stop-smoking programs and medicines. These can increase your chances of quitting for good. When should you call for help? Call 911 anytime you think you may need emergency care. For example, call if:    · You have severe trouble breathing.    Call your doctor now or seek immediate medical care if:    · You have new or worse trouble breathing.     · You cough up dark brown or bloody mucus (sputum).     · You have a new or higher fever.     · You have a new rash. Watch closely for changes in your health, and be sure to contact your doctor if:    · You cough more deeply or more often, especially if you notice more mucus or a change in the color of your mucus.     · You are not getting better as expected. Where can you learn more? Go to https://BMG Controls.20:20 Mobile. org and sign in to your Splyst account. Enter H333 in the Geekangels box to learn more about \"Bronchitis: Care Instructions. \"     If you do not have an account, please click on the \"Sign Up Now\" link. Current as of: July 6, 2021               Content Version: 13.0  © 8333-1726 Healthwise, Incorporated. Care instructions adapted under license by [facility] ([facility]). If you have questions about a medical condition or this instruction, always ask your healthcare professional. [address] any warranty or liability for your use of this information. quarantine until Friday, this will be your total of 10 days. Your covid results will be back in 1-3 days.    Push fluids  meds as prescribed

## 2021-10-12 NOTE — LETTER
[facility]  [address] [address]  Phone: [phone]  Fax: [phone]    BELGICA Zepeda CNP        October 12, 2021     Patient: Sean Rodriguez   YOB: 1985   Date of Visit: 10/12/2021       To Whom it May Concern:    Sean Rodriguez was seen in my clinic on 10/12/2021. She may return to work on 10/15/21. If you have any questions or concerns, please don't hesitate to call.     Sincerely,         BELGICA Zepeda CNP

## 2021-10-13 ENCOUNTER — TELEPHONE (OUTPATIENT)
Dept: FAMILY MEDICINE CLINIC | Age: 36
End: 2021-10-13

## 2021-10-13 LAB
SARS-COV-2: NORMAL
SARS-COV-2: NOT DETECTED
SOURCE: NORMAL

## 2021-10-14 ENCOUNTER — TELEPHONE (OUTPATIENT)
Dept: FAMILY MEDICINE CLINIC | Age: 36
End: 2021-10-14

## 2021-10-14 DIAGNOSIS — J40 BRONCHITIS: Primary | ICD-10-CM

## 2021-10-14 RX ORDER — PREDNISONE 10 MG/1
10 TABLET ORAL 2 TIMES DAILY
Qty: 10 TABLET | Refills: 0 | Status: SHIPPED | OUTPATIENT
Start: 2021-10-14 | End: 2021-10-19

## 2021-10-14 RX ORDER — ALBUTEROL SULFATE 90 UG/1
2 AEROSOL, METERED RESPIRATORY (INHALATION) 4 TIMES DAILY PRN
Qty: 18 G | Refills: 0 | Status: SHIPPED | OUTPATIENT
Start: 2021-10-14

## 2021-10-14 NOTE — TELEPHONE ENCOUNTER
Pt called stating that you had prescribed her a steroid for her cough. She states that it seems worse now. She would like to know if something else could be called into the pharmacy? Possibly an inhaler? Pt uses [facility].

## 2021-11-09 ENCOUNTER — OFFICE VISIT (OUTPATIENT)
Dept: OTOLARYNGOLOGY | Age: 36
End: 2021-11-09
Payer: MEDICARE

## 2021-11-09 VITALS
HEIGHT: 62 IN | DIASTOLIC BLOOD PRESSURE: 70 MMHG | HEART RATE: 77 BPM | RESPIRATION RATE: 18 BRPM | BODY MASS INDEX: 19.84 KG/M2 | SYSTOLIC BLOOD PRESSURE: 102 MMHG | OXYGEN SATURATION: 98 % | WEIGHT: 107.8 LBS

## 2021-11-09 DIAGNOSIS — R09.81 NASAL CONGESTION: ICD-10-CM

## 2021-11-09 DIAGNOSIS — Z98.890 S/P NASAL SEPTOPLASTY: ICD-10-CM

## 2021-11-09 DIAGNOSIS — Z98.890 S/P FESS (FUNCTIONAL ENDOSCOPIC SINUS SURGERY): Primary | ICD-10-CM

## 2021-11-09 PROCEDURE — 31231 NASAL ENDOSCOPY DX: CPT | Performed by: OTOLARYNGOLOGY

## 2021-11-09 PROCEDURE — G8484 FLU IMMUNIZE NO ADMIN: HCPCS | Performed by: OTOLARYNGOLOGY

## 2021-11-09 PROCEDURE — 99213 OFFICE O/P EST LOW 20 MIN: CPT | Performed by: OTOLARYNGOLOGY

## 2021-11-09 PROCEDURE — G8420 CALC BMI NORM PARAMETERS: HCPCS | Performed by: OTOLARYNGOLOGY

## 2021-11-09 PROCEDURE — 4004F PT TOBACCO SCREEN RCVD TLK: CPT | Performed by: OTOLARYNGOLOGY

## 2021-11-09 PROCEDURE — G8427 DOCREV CUR MEDS BY ELIG CLIN: HCPCS | Performed by: OTOLARYNGOLOGY

## 2021-11-09 PROCEDURE — 99214 OFFICE O/P EST MOD 30 MIN: CPT | Performed by: OTOLARYNGOLOGY

## 2021-11-09 RX ORDER — CEFDINIR 300 MG/1
300 CAPSULE ORAL 2 TIMES DAILY
Qty: 42 CAPSULE | Refills: 0 | Status: SHIPPED | OUTPATIENT
Start: 2021-11-09 | End: 2021-11-30

## 2021-11-09 ASSESSMENT — ENCOUNTER SYMPTOMS: SINUS COMPLAINT: 1

## 2021-11-09 NOTE — PROGRESS NOTES
2021 9:24 AM IRON Kwong (:  1985) is a 28 y.o. female,New patient, here for evaluation of the following chief complaint(s):  Sinus Problem (3 month follow up sinus surgery)      ASSESSMENT/PLAN:  1. S/P FESS (functional endoscopic sinus surgery)    2. S/P nasal septoplasty    3. Nasal congestion      1. S/P FESS (functional endoscopic sinus surgery)  2. S/P nasal septoplasty  3. Nasal congestion    Restart NeilMed sinus rinse twice a day with steroid antibiotic and antifungal solution added for one of the treatments  21 days Omnicef  Follow-up in a month  Consider repeat ct scan     No follow-ups on file. SUBJECTIVE/OBJECTIVE:  HPI  77-year-old woman with chronic sinusitis. She endorses a strong childhood history of allergies. She also has a strong family history of allergies. They improved as she grew older and last year she would take a Claritin only as needed. She endorses 1-2 episodes of sinus infection versus upper respiratory infections a year. More recently over the last few months she has been on 3 courses of antibiotics starting with a Z-Eulogio and then Bactrim, Flonase, antihistamine and currently is on Levaquin, prednisone. Her current symptoms include facial pain and pressure, worse on left side, bilateral nasal obstruction, green nasal drainage, denies change in smell. She has had minimal improvement in her left-sided facial pressure, the most prominent feature. She denies vision changes. Endorses some dryness to the front of her nose. Her current episode started with copious sinus drainage. She also experienced left facial swelling but that has improved on prednisone. She has completed 3-week course of antibiotics but continues to have headaches and sinus pressure. He also endorses some nasal obstruction.     CT sinus performed 2021 at [facility]:  FINDINGS:            The paranasal sinuses are paired and well-developed bilaterally.            There is moderate chronic right and mild chronic left maxillary sinusitis.          There is mild mucosal thickening in the ethmoid sinuses and there is opacification of the left    sphenoid sinus.  The frontal sinuses are clear.          No air fluid levels, soft tissue masses or osseous erosions.          No mastoid effusions.          The regional bones are intact.            IMPRESSION:     1.  Multifocal sinusitis.       Today she is 6 months s/p endoscopic sinus surgery (sphenoid balloon, frontal balloon, maxillary, anterior ethmoid), septoplasty, turbinate reduction 5/17/21. Left side still has some thick mucus. Continued congestion. Not much improvement with flonase. Did have some improvement with sinus rinse. She was recently on oral steroids for pulmonary issues and did not notice any improvement in the nose. Review of Systems  ENT ROS: positive for - sinus pain    General: The patient is found to be alert and normally responsive female with grossly normal hearing, clear voice and normal articulation. Communication is without difficulty. Voice: Clear   Skin: The skin has normal colour and turgor. Face: The facial contour is symmetric at rest and with movement. Ears: The pinnae have normal contours. AD: EAC clear, TM intact, no effusion/erythema/retraction   AS: EAC clear, TM intact, no effusion/erythema/retraction   Eye: The ocular movements are full and symmetric, the conjunctiva is unremarkable; sclera are anicteric, pupillary response is symmetric. No nystagmus is found. Nose:   The external nasal contour is normal  The nasal mucosa is pink, healing well   the nasal septum is straight. The turbinates are healed  The nares are patent without evidence of polyposis   Oral cavity:   The dentition is healthy. The oral mucosa is without lesions;  the tongue is symmetric with full mobility and is without fasciculation. The soft palate is symmetric. The oropharynx is unremarkable.   Neck: The neck has a normal contour; no masses are found on palpation    Fiberoptic examination of the upper airway using flexible scope was conducted after first applying topical phenylephrine (1%) and lidocaine (4%) to the bilateral nasal cavity by spray. The endoscope was inserted and advanced through the bilateral side of the nose examining the mucosa and nasal structures. Right:  Inferior turbinate normal, patent maxillary ostia, middle meatus clear, no polyps or purulence  Left:  Inferior turbinate mild edema posteriorly, patent maxillary ostia and ethmoids, middle meatus clear, no polyps or purulence    An electronic signature was used to authenticate this note.     --Serjio Gold MD     11/9/2021 9:24 AM EST

## 2022-01-04 ENCOUNTER — OFFICE VISIT (OUTPATIENT)
Dept: OTOLARYNGOLOGY | Age: 37
End: 2022-01-04
Payer: MEDICARE

## 2022-01-04 VITALS
WEIGHT: 105.8 LBS | BODY MASS INDEX: 19.47 KG/M2 | DIASTOLIC BLOOD PRESSURE: 60 MMHG | HEIGHT: 62 IN | HEART RATE: 89 BPM | OXYGEN SATURATION: 98 % | SYSTOLIC BLOOD PRESSURE: 110 MMHG

## 2022-01-04 DIAGNOSIS — J32.9 CHRONIC SINUSITIS, UNSPECIFIED LOCATION: Primary | ICD-10-CM

## 2022-01-04 PROCEDURE — G8427 DOCREV CUR MEDS BY ELIG CLIN: HCPCS | Performed by: OTOLARYNGOLOGY

## 2022-01-04 PROCEDURE — 99212 OFFICE O/P EST SF 10 MIN: CPT | Performed by: OTOLARYNGOLOGY

## 2022-01-04 PROCEDURE — 31231 NASAL ENDOSCOPY DX: CPT | Performed by: OTOLARYNGOLOGY

## 2022-01-04 PROCEDURE — 99213 OFFICE O/P EST LOW 20 MIN: CPT | Performed by: OTOLARYNGOLOGY

## 2022-01-04 PROCEDURE — 4004F PT TOBACCO SCREEN RCVD TLK: CPT | Performed by: OTOLARYNGOLOGY

## 2022-01-04 PROCEDURE — G8420 CALC BMI NORM PARAMETERS: HCPCS | Performed by: OTOLARYNGOLOGY

## 2022-01-04 PROCEDURE — G8484 FLU IMMUNIZE NO ADMIN: HCPCS | Performed by: OTOLARYNGOLOGY

## 2022-01-04 ASSESSMENT — ENCOUNTER SYMPTOMS: SINUS COMPLAINT: 1

## 2022-01-04 NOTE — PROGRESS NOTES
2022 9:24 AM IRON Whitaker (:  1985) is a 39 y.o. female,New patient, here for evaluation of the following chief complaint(s):  Sinus Problem (1 month)      ASSESSMENT/PLAN:  1. Chronic sinusitis, unspecified location      1. Chronic sinusitis, unspecified location  -     CT SINUS WO CONTRAST; Future       Continue NeilMed sinus rinse once a day   Continue flonase every other day but encouraged increasing to every day or bid   Follow-up in 3 months  repeat ct scan in 2 weeks, following resolution of current URI    No follow-ups on file. SUBJECTIVE/OBJECTIVE:  Sinus Problem      79-year-old woman with chronic sinusitis. She endorses a strong childhood history of allergies. She also has a strong family history of allergies. They improved as she grew older and last year she would take a Claritin only as needed. She endorses 1-2 episodes of sinus infection versus upper respiratory infections a year. More recently over the last few months she has been on 3 courses of antibiotics starting with a Z-Eulogio and then Bactrim, Flonase, antihistamine and currently is on Levaquin, prednisone. Her current symptoms include facial pain and pressure, worse on left side, bilateral nasal obstruction, green nasal drainage, denies change in smell. She has had minimal improvement in her left-sided facial pressure, the most prominent feature. She denies vision changes. Endorses some dryness to the front of her nose. Her current episode started with copious sinus drainage. She also experienced left facial swelling but that has improved on prednisone. She has completed 3-week course of antibiotics but continues to have headaches and sinus pressure. He also endorses some nasal obstruction.     CT sinus performed 2021 at [facility]:  FINDINGS:            The paranasal sinuses are paired and well-developed bilaterally.            There is moderate chronic right and mild chronic left maxillary sinusitis.        There is mild mucosal thickening in the ethmoid sinuses and there is opacification of the left    sphenoid sinus.  The frontal sinuses are clear.          No air fluid levels, soft tissue masses or osseous erosions.          No mastoid effusions.          The regional bones are intact.            IMPRESSION:     1.  Multifocal sinusitis.       She is s/p endoscopic sinus surgery (sphenoid balloon, frontal balloon, maxillary, anterior ethmoid), septoplasty, turbinate reduction 5/17/21. She has been struggling with on going sxs since surgery. We just treated with 21 days of Omnicef and twice a day NeilMed rinses one of which has antibiotic additive. Overall when she first started this treatment regimen her symptoms were very well controlled. Some of the symptoms have started to return but overall she is feeling better. Review of Systems  ENT ROS: positive for - sinus pain    General: The patient is found to be alert and normally responsive female with grossly normal hearing, clear voice and normal articulation. Communication is without difficulty. Voice: Clear   Skin: The skin has normal colour and tur is right gor. Face: The facial contour is symmetric at rest and with movement. Ears: The pinnae have normal contours. AD: EAC clear, TM intact, no effusion/erythema/retraction   AS: EAC clear, TM intact, no effusion/erythema/retraction   Eye: The ocular movements are full and symmetric, the conjunctiva is unremarkable; sclera are anicteric, pupillary response is symmetric. No nystagmus is found. Nose:   The external nasal contour is normal  The nasal mucosa is pink, healing well   the nasal septum is straight. The turbinates are healed  The nares are patent without evidence of polyposis   Oral cavity:   The dentition is healthy. The oral mucosa is without lesions;  the tongue is symmetric with full mobility and is without fasciculation. The soft palate is symmetric.    The oropharynx is unremarkable. Neck: The neck has a normal contour; no masses are found on palpation    Fiberoptic examination of the upper airway using flexible scope was conducted after first applying topical phenylephrine (1%) and lidocaine (4%) to the bilateral nasal cavity by spray. The endoscope was inserted and advanced through the bilateral side of the nose examining the mucosa and nasal structures. Right:  Inferior turbinate normal, patent maxillary ostia, middle meatus clear, no polyps or purulence  Left:  Inferior turbinate mild edema posteriorly, patent maxillary ostia and ethmoids, middle meatus clear, no polyps or purulence    An electronic signature was used to authenticate this note.     --Elham Leal MD     1/5/2022 9:24 AM EST

## 2022-01-06 ENCOUNTER — TELEPHONE (OUTPATIENT)
Dept: OTOLARYNGOLOGY | Age: 37
End: 2022-01-06

## 2022-01-10 ENCOUNTER — OFFICE VISIT (OUTPATIENT)
Dept: FAMILY MEDICINE CLINIC | Age: 37
End: 2022-01-10
Payer: MEDICARE

## 2022-01-10 VITALS
RESPIRATION RATE: 14 BRPM | HEART RATE: 69 BPM | HEIGHT: 62 IN | SYSTOLIC BLOOD PRESSURE: 120 MMHG | TEMPERATURE: 99.1 F | WEIGHT: 105 LBS | DIASTOLIC BLOOD PRESSURE: 82 MMHG | OXYGEN SATURATION: 99 % | BODY MASS INDEX: 19.32 KG/M2

## 2022-01-10 DIAGNOSIS — R10.32 LLQ ABDOMINAL PAIN: Primary | ICD-10-CM

## 2022-01-10 DIAGNOSIS — R11.0 NAUSEA: ICD-10-CM

## 2022-01-10 LAB
APPEARANCE FLUID: CLEAR
BILIRUBIN, POC: NEGATIVE
BLOOD URINE, POC: NORMAL
CLARITY, POC: CLEAR
COLOR, POC: YELLOW
GLUCOSE URINE, POC: NEGATIVE
HCG, URINE, POC: NEGATIVE
INFLUENZA A ANTIBODY: NEGATIVE
INFLUENZA B ANTIBODY: NEGATIVE
KETONES, POC: NEGATIVE
LEUKOCYTE EST, POC: NEGATIVE
Lab: NORMAL
NEGATIVE QC PASS/FAIL: NORMAL
NITRITE, POC: NEGATIVE
PH, POC: 6
POSITIVE QC PASS/FAIL: NORMAL
PROTEIN, POC: NEGATIVE
SPECIFIC GRAVITY, POC: 1.02
UROBILINOGEN, POC: 0.2

## 2022-01-10 PROCEDURE — G8420 CALC BMI NORM PARAMETERS: HCPCS | Performed by: NURSE PRACTITIONER

## 2022-01-10 PROCEDURE — PBSHW POC PREGNANCY UR-QUAL: Performed by: NURSE PRACTITIONER

## 2022-01-10 PROCEDURE — 99214 OFFICE O/P EST MOD 30 MIN: CPT | Performed by: NURSE PRACTITIONER

## 2022-01-10 PROCEDURE — PBSHW POCT INFLUENZA A/B: Performed by: NURSE PRACTITIONER

## 2022-01-10 PROCEDURE — 81025 URINE PREGNANCY TEST: CPT | Performed by: NURSE PRACTITIONER

## 2022-01-10 PROCEDURE — G8484 FLU IMMUNIZE NO ADMIN: HCPCS | Performed by: NURSE PRACTITIONER

## 2022-01-10 PROCEDURE — 87804 INFLUENZA ASSAY W/OPTIC: CPT | Performed by: NURSE PRACTITIONER

## 2022-01-10 PROCEDURE — G8427 DOCREV CUR MEDS BY ELIG CLIN: HCPCS | Performed by: NURSE PRACTITIONER

## 2022-01-10 PROCEDURE — 4004F PT TOBACCO SCREEN RCVD TLK: CPT | Performed by: NURSE PRACTITIONER

## 2022-01-10 RX ORDER — CIPROFLOXACIN 500 MG/1
500 TABLET, FILM COATED ORAL 2 TIMES DAILY
Qty: 14 TABLET | Refills: 0 | Status: SHIPPED | OUTPATIENT
Start: 2022-01-10 | End: 2022-01-17

## 2022-01-10 RX ORDER — METRONIDAZOLE 500 MG/1
500 TABLET ORAL 3 TIMES DAILY
Qty: 21 TABLET | Refills: 0 | Status: SHIPPED | OUTPATIENT
Start: 2022-01-10 | End: 2022-01-17

## 2022-01-10 RX ORDER — PROMETHAZINE HYDROCHLORIDE 25 MG/1
25 TABLET ORAL 3 TIMES DAILY PRN
Qty: 12 TABLET | Refills: 0 | Status: SHIPPED | OUTPATIENT
Start: 2022-01-10 | End: 2022-01-17

## 2022-01-10 ASSESSMENT — ENCOUNTER SYMPTOMS
COUGH: 0
RHINORRHEA: 0
VOMITING: 0
NAUSEA: 1
SORE THROAT: 0

## 2022-01-10 NOTE — PATIENT INSTRUCTIONS
Likely diverticulitis. Cipro as directed. Flagyl as directed. No alcohol for 7 days. Phenergan as needed for nausea. Do not operate heavy machinery or drive while taking this medication. Follow up with primary care provider in 1 to 2 days if needed. Note for work for today. Patient Education        Nausea and Vomiting: Care Instructions  Overview     When you are nauseated, you may feel weak and sweaty and notice a lot of saliva in your mouth. Nausea often leads to vomiting. Most of the time you do not need to worry about nausea and vomiting, but they can be signs of other illnesses. Two common causes of nausea and vomiting are a stomach infection and food poisoning. Nausea and vomiting from a viral stomach infection will usually start to improve within 24 hours. Nausea and vomiting from food poisoning may last from 12 to 48 hours. The doctor has checked you carefully, but problems can develop later. If you notice any problems or new symptoms, get medical treatment right away. Follow-up care is a key part of your treatment and safety. Be sure to make and go to all appointments, and call your doctor if you are having problems. It's also a good idea to know your test results and keep a list of the medicines you take. How can you care for yourself at home? · To prevent dehydration, drink plenty of fluids. Choose water and other clear liquids until you feel better. If you have kidney, heart, or liver disease and have to limit fluids, talk with your doctor before you increase the amount of fluids you drink. · Rest in bed until you feel better. · When you are able to eat, try clear soups, mild foods, and liquids until all symptoms are gone for 12 to 48 hours. Other good choices include dry toast, crackers, cooked cereal, and gelatin dessert, such as Jell-O. When should you call for help? Call 911 anytime you think you may need emergency care.  For example, call if:    · You passed out (lost consciousness). Call your doctor now or seek immediate medical care if:    · You have symptoms of dehydration, such as:  ? Dry eyes and a dry mouth. ? Passing only a little urine. ? Feeling thirstier than usual.     · You have new or worsening belly pain.     · You have a new or higher fever.     · You vomit blood or what looks like coffee grounds. Watch closely for changes in your health, and be sure to contact your doctor if:    · You have ongoing nausea and vomiting.     · Your vomiting is getting worse.     · Your vomiting lasts longer than 2 days.     · You are not getting better as expected. Where can you learn more? Go to https://PrizeBoxâ„¢peReadWave.Mobile Ads. org and sign in to your RingCube Technologies account. Enter 11 [id number] in the Gaia Metrics box to learn more about \"Nausea and Vomiting: Care Instructions. \"     If you do not have an account, please click on the \"Sign Up Now\" link. Current as of: July 1, 2021               Content Version: 13.1  © 2006-2021 Appcore. Care instructions adapted under license by [facility] ([facility]). If you have questions about a medical condition or this instruction, always ask your healthcare professional. [place] [address] any warranty or liability for your use of this information. Patient Education        Learning About Diverticulosis and Diverticulitis  What are diverticulosis and diverticulitis? In diverticulosis and diverticulitis, pouches called diverticula form in the wall of the large intestine, or colon. · In diverticulosis, the pouches do not cause any pain or other symptoms. · In diverticulitis, the pouches get inflamed or infected and cause symptoms. Doctors aren't sure what causes these pouches in the colon. But they think that a low-fiber diet may play a role. Without fiber to add bulk to the stool, the colon has to work harder than normal to push the stool forward.  The pressure from this may cause pouches to form in weak spots along the colon. Some people with diverticulosis get diverticulitis. But experts don't know why this happens. What are the symptoms? · In diverticulosis, most people don't have symptoms. But pouches sometimes bleed. · In diverticulitis, symptoms may last from a few hours to a week or more. They include:  ? Belly pain. This is usually in the lower left side. It is sometimes worse when you move. This is the most common symptom. ? Fever and chills. ? Bloating and gas. ? Diarrhea or constipation. ? Nausea and sometimes vomiting.  ? Not feeling like eating. How can you prevent diverticulitis? You may be able to lower your chance of getting diverticulitis. You can do this by taking steps to prevent constipation. · Eat fruits, vegetables, beans, and whole grains every day. These foods are high in fiber. · Drink plenty of fluids. If you have kidney, heart, or liver disease and have to limit fluids, talk with your doctor before you increase the amount of fluids you drink. · Get at least 30 minutes of exercise on most days of the week. Walking is a good choice. You also may want to do other activities, such as running, swimming, cycling, or playing tennis or team sports. · Take a fiber supplement, such as Citrucel or Metamucil, every day if needed. Read and follow all instructions on the label. · Schedule time each day for a bowel movement. Having a daily routine may help. Take your time and do not strain when having a bowel movement. Some people avoid nuts, seeds, berries, and popcorn. They believe that these foods might get trapped in the diverticula and cause pain. But there is no proof that these foods cause diverticulitis or make it worse. How are these problems treated? · The best way to treat diverticulosis is to avoid constipation. · Treatment for diverticulitis includes antibiotics. It often includes a change in your diet.  You may need only liquids at first. Your doctor may suggest pain medicines for pain or belly cramps. In some cases, surgery may be needed. Follow-up care is a key part of your treatment and safety. Be sure to make and go to all appointments, and call your doctor if you are having problems. It's also a good idea to know your test results and keep a list of the medicines you take. Where can you learn more? Go to https://Vivace SemiconductorpeRefer.com.Cogeco Cable. org and sign in to your Refresh Body account. Enter W971 in the Swap.com / Netcycler box to learn more about \"Learning About Diverticulosis and Diverticulitis. \"     If you do not have an account, please click on the \"Sign Up Now\" link. Current as of: September 8, 2021               Content Version: 13.1  © 5849-8033 Healthwise, Incorporated. Care instructions adapted under license by [facility] ([facility]). If you have questions about a medical condition or this instruction, always ask your healthcare professional. [place] [address] any warranty or liability for your use of this information.

## 2022-01-10 NOTE — PROGRESS NOTES
[address], APRN-CNP  [address]  Phone:  [phone]  Fax:  [phone]  Agatha Solis is a 39 y.o. female who presents today for her medical conditions/complaints as noted below. Agatha Solis c/o of Nausea (stomach pain. s/s started a few weeks ago, off and on.)      HPI:     Nausea & Vomiting  This is a new problem. The current episode started 1 to 4 weeks ago. The problem has been waxing and waning. Associated symptoms include chills, fatigue and nausea. Pertinent negatives include no congestion, coughing, diaphoresis, fever, headaches, sore throat or vomiting. Treatments tried: zofran. The treatment provided no relief.        Wt Readings from Last 3 Encounters:   01/10/22 105 lb (47.6 kg)   22 105 lb 12.8 oz (48 kg)   21 107 lb 12.8 oz (48.9 kg)       Temp Readings from Last 3 Encounters:   01/10/22 99.1 °F (37.3 °C)   06/10/21 98.6 °F (37 °C)   21 97.3 °F (36.3 °C) (Temporal)       BP Readings from Last 3 Encounters:   01/10/22 120/82   22 110/60   21 102/70       Pulse Readings from Last 3 Encounters:   01/10/22 69   22 89   21 77        SpO2 Readings from Last 3 Encounters:   01/10/22 99%   22 98%   21 98%             Past Medical History:   Diagnosis Date    Allergic rhinitis     Anxiety and depression     Asthma     as a child, no recent symptoms    Gestational hypertension     history of    Hyperopia with astigmatism     Panic disorder     Tobacco use       Past Surgical History:   Procedure Laterality Date     SECTION, LOW TRANSVERSE  2006    Dr. John Dalal, [address]      dysfunctional uterine bleeding    SINUS ENDOSCOPY N/A 2021    Bilateral Endoscopic Sinus surgery, Septoplasty, Bilateral Inferior turbinate reduction performed by Ratna Sanchez MD at [facility] OR     Family History   Problem Relation Age of Onset    Cancer Mother     Migraines Mother     Depression Mother     Cancer Father         unsure of type    Asthma Son     Glaucoma Neg Hx     Cataracts Neg Hx     Diabetes Neg Hx      Social History     Tobacco Use    Smoking status: Current Every Day Smoker     Packs/day: 1.00     Years: 10.00     Pack years: 10.00    Smokeless tobacco: Never Used   Substance Use Topics    Alcohol use: No      Current Outpatient Medications   Medication Sig Dispense Refill    metroNIDAZOLE (FLAGYL) 500 MG tablet Take 1 tablet by mouth 3 times daily for 7 days Take with food. 21 tablet 0    ciprofloxacin (CIPRO) 500 MG tablet Take 1 tablet by mouth 2 times daily for 7 days 14 tablet 0    promethazine (PHENERGAN) 25 MG tablet Take 1 tablet by mouth 3 times daily as needed for Nausea Do not operate heavy machinery or drive while taking this medication. 12 tablet 0    albuterol sulfate HFA (VENTOLIN HFA) 108 (90 Base) MCG/ACT inhaler Inhale 2 puffs into the lungs 4 times daily as needed for Wheezing 18 g 0    hydrOXYzine (ATARAX) 25 MG tablet       fluticasone (FLONASE) 50 MCG/ACT nasal spray 2 sprays by Each Nostril route daily (Patient taking differently: 2 sprays by Each Nostril route daily Taking every other day) 3 Bottle 3    acetaminophen (TYLENOL) 500 MG tablet Take 500 mg by mouth every 6 hours as needed for Pain       ondansetron (ZOFRAN-ODT) 4 MG disintegrating tablet Take 1 tablet by mouth 3 times daily as needed for Nausea or Vomiting (Patient not taking: Reported on 11/9/2021) 21 tablet 0    azelastine (ASTELIN) 0.1 % nasal spray 2 sprays by Nasal route 2 times daily Use in each nostril as directed (Patient not taking: Reported on 1/4/2022) 1 Bottle 3    clotrimazole-betamethasone (LOTRISONE) 1-0.05 % cream Apply topically 2 times daily. (Patient not taking: Reported on 1/10/2022) 1 Tube 0     No current facility-administered medications for this visit.      Allergies   Allergen Reactions    Penicillins Rash Status: She is alert and oriented to person, place, and time. Psychiatric:         Mood and Affect: Mood normal.         Speech: Speech normal.         Behavior: Behavior normal.         Thought Content: Thought content normal.         Judgment: Judgment normal.         Assessment:      Diagnosis Orders   1. LLQ abdominal pain  metroNIDAZOLE (FLAGYL) 500 MG tablet    ciprofloxacin (CIPRO) 500 MG tablet   2. Nausea  POCT Influenza A/B    POC Pregnancy Urine Qual    metroNIDAZOLE (FLAGYL) 500 MG tablet    ciprofloxacin (CIPRO) 500 MG tablet    promethazine (PHENERGAN) 25 MG tablet     No results found for this visit on 01/10/22. Plan:       Likely diverticulitis. Cipro as directed. Flagyl as directed. No alcohol for 7 days. Phenergan as needed for nausea. Do not operate heavy machinery or drive while taking this medication. Follow up with primary care provider in 1 to 2 days if needed. Note for work for today. Patient Instructions     Likely diverticulitis. Cipro as directed. Flagyl as directed. No alcohol for 7 days. Phenergan as needed for nausea. Do not operate heavy machinery or drive while taking this medication. Follow up with primary care provider in 1 to 2 days if needed. Note for work for today. Patient Education        Nausea and Vomiting: Care Instructions  Overview     When you are nauseated, you may feel weak and sweaty and notice a lot of saliva in your mouth. Nausea often leads to vomiting. Most of the time you do not need to worry about nausea and vomiting, but they can be signs of other illnesses. Two common causes of nausea and vomiting are a stomach infection and food poisoning. Nausea and vomiting from a viral stomach infection will usually start to improve within 24 hours. Nausea and vomiting from food poisoning may last from 12 to 48 hours. The doctor has checked you carefully, but problems can develop later.  If you notice any problems or new symptoms, get medical treatment right away. Follow-up care is a key part of your treatment and safety. Be sure to make and go to all appointments, and call your doctor if you are having problems. It's also a good idea to know your test results and keep a list of the medicines you take. How can you care for yourself at home? · To prevent dehydration, drink plenty of fluids. Choose water and other clear liquids until you feel better. If you have kidney, heart, or liver disease and have to limit fluids, talk with your doctor before you increase the amount of fluids you drink. · Rest in bed until you feel better. · When you are able to eat, try clear soups, mild foods, and liquids until all symptoms are gone for 12 to 48 hours. Other good choices include dry toast, crackers, cooked cereal, and gelatin dessert, such as Jell-O. When should you call for help? Call 911 anytime you think you may need emergency care. For example, call if:    · You passed out (lost consciousness). Call your doctor now or seek immediate medical care if:    · You have symptoms of dehydration, such as:  ? Dry eyes and a dry mouth. ? Passing only a little urine. ? Feeling thirstier than usual.     · You have new or worsening belly pain.     · You have a new or higher fever.     · You vomit blood or what looks like coffee grounds. Watch closely for changes in your health, and be sure to contact your doctor if:    · You have ongoing nausea and vomiting.     · Your vomiting is getting worse.     · Your vomiting lasts longer than 2 days.     · You are not getting better as expected. Where can you learn more? Go to https://AgileSourcecesiilaDapper.50 Cubes. org and sign in to your Inspiris account. Enter 40 [id number] in the Mixamo box to learn more about \"Nausea and Vomiting: Care Instructions. \"     If you do not have an account, please click on the \"Sign Up Now\" link.   Current as of: July 1, 2021               Content Version: 13.1  © 0566-0141 Healthwise, Incorporated. Care instructions adapted under license by [facility] ([facility]). If you have questions about a medical condition or this instruction, always ask your healthcare professional. [address] any warranty or liability for your use of this information. Patient Education        Learning About Diverticulosis and Diverticulitis  What are diverticulosis and diverticulitis? In diverticulosis and diverticulitis, pouches called diverticula form in the wall of the large intestine, or colon. · In diverticulosis, the pouches do not cause any pain or other symptoms. · In diverticulitis, the pouches get inflamed or infected and cause symptoms. Doctors aren't sure what causes these pouches in the colon. But they think that a low-fiber diet may play a role. Without fiber to add bulk to the stool, the colon has to work harder than normal to push the stool forward. The pressure from this may cause pouches to form in weak spots along the colon. Some people with diverticulosis get diverticulitis. But experts don't know why this happens. What are the symptoms? · In diverticulosis, most people don't have symptoms. But pouches sometimes bleed. · In diverticulitis, symptoms may last from a few hours to a week or more. They include:  ? Belly pain. This is usually in the lower left side. It is sometimes worse when you move. This is the most common symptom. ? Fever and chills. ? Bloating and gas. ? Diarrhea or constipation. ? Nausea and sometimes vomiting.  ? Not feeling like eating. How can you prevent diverticulitis? You may be able to lower your chance of getting diverticulitis. You can do this by taking steps to prevent constipation. · Eat fruits, vegetables, beans, and whole grains every day. These foods are high in fiber. · Drink plenty of fluids.  If you have kidney, heart, or liver disease and have to limit fluids, talk with your doctor before you increase the amount of fluids you drink. · Get at least 30 minutes of exercise on most days of the week. Walking is a good choice. You also may want to do other activities, such as running, swimming, cycling, or playing tennis or team sports. · Take a fiber supplement, such as Citrucel or Metamucil, every day if needed. Read and follow all instructions on the label. · Schedule time each day for a bowel movement. Having a daily routine may help. Take your time and do not strain when having a bowel movement. Some people avoid nuts, seeds, berries, and popcorn. They believe that these foods might get trapped in the diverticula and cause pain. But there is no proof that these foods cause diverticulitis or make it worse. How are these problems treated? · The best way to treat diverticulosis is to avoid constipation. · Treatment for diverticulitis includes antibiotics. It often includes a change in your diet. You may need only liquids at first. Your doctor may suggest pain medicines for pain or belly cramps. In some cases, surgery may be needed. Follow-up care is a key part of your treatment and safety. Be sure to make and go to all appointments, and call your doctor if you are having problems. It's also a good idea to know your test results and keep a list of the medicines you take. Where can you learn more? Go to https://BoxcarpeMycroft Inc..Advanced Mem-Tech. org and sign in to your CounterTack account. Enter D598 in the [facility] box to learn more about \"Learning About Diverticulosis and Diverticulitis. \"     If you do not have an account, please click on the \"Sign Up Now\" link. Current as of: September 8, 2021               Content Version: 13.1  © 3558-7911 Healthwise, Top Rops. Care instructions adapted under license by [facility] ([facility]).  If you have questions about a medical condition or this instruction, always ask your healthcare professional. [place] any warranty or liability for your use of this information. Patient/Caregiver instructed on use, benefit, and side effects of prescribed medications. All patient/parent/caregiver questions answered. Patient/parent/caregiver voiced understanding. Reviewed health maintenance. Instructed to continue current medications, diet and exercise. Patient agreed with treatment plan. Follow up as directed.            Electronically signed by BELGICA Kc NP on1/10/2022

## 2022-01-10 NOTE — LETTER
[facility] / [address] [place] [address]  Phone: [phone]  Fax: [phone]      COSMO Boland NP    Emir Layne  1985       01/10/22           To Whom it May concern:    Emir Layne was seen in my clinic on 01/10/22. It is of my medical opinion that she is to be excused from work on 1/10/22. If you have any questions or concerns, please don't hesitate to call our office.       Sincerely,            COSMO Boland NP

## 2022-01-19 ENCOUNTER — HOSPITAL ENCOUNTER (OUTPATIENT)
Dept: CT IMAGING | Age: 37
Discharge: HOME | End: 2022-01-21
Payer: MEDICARE

## 2022-01-19 DIAGNOSIS — J32.9 CHRONIC SINUSITIS, UNSPECIFIED LOCATION: ICD-10-CM

## 2022-01-19 PROCEDURE — 70486 CT MAXILLOFACIAL W/O DYE: CPT

## 2022-01-26 ENCOUNTER — TELEPHONE (OUTPATIENT)
Dept: OTOLARYNGOLOGY | Age: 37
End: 2022-01-26

## 2022-01-26 NOTE — TELEPHONE ENCOUNTER
Patient called the office requesting her CT scan results from last week 1/19/2022.   Patient's call back ph# [phone]

## 2022-01-31 RX ORDER — CEFDINIR 300 MG/1
300 CAPSULE ORAL 2 TIMES DAILY
Qty: 42 CAPSULE | Refills: 0 | Status: SHIPPED | OUTPATIENT
Start: 2022-01-31 | End: 2022-02-21

## 2022-01-31 NOTE — TELEPHONE ENCOUNTER
I spoke to Fara Antony and Dr. Murali Martin has sent in an antibiotic to her pharmacy and we moved her follow up appt up to March . Patient voiced understanding at the time of the call.

## 2022-04-04 ENCOUNTER — TELEPHONE (OUTPATIENT)
Dept: OTOLARYNGOLOGY | Age: 37
End: 2022-04-04

## 2022-04-04 NOTE — TELEPHONE ENCOUNTER
Patient no showed appointment with Dr. Low Mckeon today at 1:20 pm. Writer called patient and rescheduled to 5/6/2022 at 8:20 am.

## 2022-10-12 ENCOUNTER — OFFICE VISIT (OUTPATIENT)
Dept: FAMILY MEDICINE CLINIC | Age: 37
End: 2022-10-12
Payer: MEDICARE

## 2022-10-12 VITALS
DIASTOLIC BLOOD PRESSURE: 64 MMHG | OXYGEN SATURATION: 98 % | SYSTOLIC BLOOD PRESSURE: 104 MMHG | BODY MASS INDEX: 18.47 KG/M2 | HEART RATE: 61 BPM | WEIGHT: 101 LBS

## 2022-10-12 DIAGNOSIS — F32.A ANXIETY AND DEPRESSION: ICD-10-CM

## 2022-10-12 DIAGNOSIS — F41.9 ANXIETY AND DEPRESSION: ICD-10-CM

## 2022-10-12 DIAGNOSIS — R07.9 CHEST PAIN, UNSPECIFIED TYPE: Primary | ICD-10-CM

## 2022-10-12 DIAGNOSIS — K21.9 GASTROESOPHAGEAL REFLUX DISEASE WITHOUT ESOPHAGITIS: ICD-10-CM

## 2022-10-12 PROCEDURE — 99212 OFFICE O/P EST SF 10 MIN: CPT

## 2022-10-12 PROCEDURE — 4004F PT TOBACCO SCREEN RCVD TLK: CPT

## 2022-10-12 PROCEDURE — G8427 DOCREV CUR MEDS BY ELIG CLIN: HCPCS

## 2022-10-12 PROCEDURE — 99214 OFFICE O/P EST MOD 30 MIN: CPT

## 2022-10-12 PROCEDURE — G8484 FLU IMMUNIZE NO ADMIN: HCPCS

## 2022-10-12 PROCEDURE — G8419 CALC BMI OUT NRM PARAM NOF/U: HCPCS

## 2022-10-12 RX ORDER — PANTOPRAZOLE SODIUM 40 MG/1
40 TABLET, DELAYED RELEASE ORAL DAILY
Qty: 90 TABLET | Refills: 0 | Status: SHIPPED | OUTPATIENT
Start: 2022-10-12

## 2022-10-12 RX ORDER — HYDROXYZINE HYDROCHLORIDE 25 MG/1
25 TABLET, FILM COATED ORAL EVERY 6 HOURS PRN
Qty: 90 TABLET | Refills: 0 | Status: SHIPPED | OUTPATIENT
Start: 2022-10-12

## 2022-10-12 RX ORDER — PAROXETINE 10 MG/1
10 TABLET, FILM COATED ORAL DAILY
Qty: 30 TABLET | Refills: 1 | Status: SHIPPED | OUTPATIENT
Start: 2022-10-12

## 2022-10-12 ASSESSMENT — ENCOUNTER SYMPTOMS
CONSTIPATION: 0
COUGH: 1
NAUSEA: 0
SINUS PRESSURE: 0
WHEEZING: 0
BACK PAIN: 0
EYE ITCHING: 0
DIARRHEA: 0
CHEST TIGHTNESS: 0
SINUS PAIN: 0
COLOR CHANGE: 0
SHORTNESS OF BREATH: 0
ABDOMINAL PAIN: 0
RHINORRHEA: 0
EYE DISCHARGE: 0

## 2022-10-12 NOTE — ASSESSMENT & PLAN NOTE
Uncontrolled, continue current medications and continue current treatment plan start Paxil 10 mg tablets once nightly. Side effects this medication are discussed, Miguel Angel Garcia verbalized understanding. If she becomes suicidal she is to go to the ER right away. Use Atarax 25 mg tablets as needed every 6 hours for anxiety. Side effects this medication are discussed. Miguel Angel Garcia verbalized understanding of the side effects of these medications, will let office know right away if she develops any side effects.

## 2022-10-12 NOTE — PROGRESS NOTES
Eliane Schumacher (:  1985) is a 39 y.o. female,Established patient, here for evaluation of the following chief complaint(s): Anxiety (Patient is here today for anxiety and chest pain that the ED thinks is a pulled muscle. She has severe headaches everyday. She also works several hours a week. ) and Headache         ASSESSMENT/PLAN:  1. Chest pain, unspecified type  2. Anxiety and depression  Assessment & Plan:   Uncontrolled, continue current medications and continue current treatment plan start Paxil 10 mg tablets once nightly. Side effects this medication are discussed, Siobhan Asher verbalized understanding. If she becomes suicidal she is to go to the ER right away. Use Atarax 25 mg tablets as needed every 6 hours for anxiety. Side effects this medication are discussed. Siobhan Asher verbalized understanding of the side effects of these medications, will let office know right away if she develops any side effects. Orders:  -     PARoxetine (PAXIL) 10 MG tablet; Take 1 tablet by mouth daily, Disp-30 tablet, R-1Normal  -     hydrOXYzine HCl (ATARAX) 25 MG tablet; Take 1 tablet by mouth every 6 hours as needed for Itching, Disp-90 tablet, R-0Normal  3. Gastroesophageal reflux disease without esophagitis  Assessment & Plan:   Uncontrolled, changes made today: Start Protonix 40 mg tablets. Take first in the morning on empty stomach with sips of water. Wait 45 minutes before eating or drinking. and lifestyle modifications recommended controlled diet small frequent meals, low acid foods. Stop smoking as this will help tremendously. Orders:  -     pantoprazole (PROTONIX) 40 MG tablet; Take 1 tablet by mouth daily, Disp-90 tablet, R-0Normal      Return in about 2 months (around 2022), or if symptoms worsen or fail to improve. Subjective   SUBJECTIVE/OBJECTIVE:  Siobhan Asher is here today for an ER follow-up. She was in the emergency department today at 8 AM with chest pain left-sided.   They did a cardiac work-up which was negative, including negative D-dimer, negative chest x-ray, negative cardiac enzymes. She tells me that they gave her 4 baby aspirin's and Motrin which did not relieve her pain. EKG was normal, she is a smoker, denies increased pain with inspiration. Pain is located left-sided midsternal, does not radiate, is worse with palpation. It does appear to be costochondritis and or musculoskeletal in nature, however she does tell me that she has been under a lot of stress, and is quite anxious. She does work quite a few hours, more than 40 a week. She has been on Celexa and Lexapro in the past with some relief, is no longer on any medications for anxiety depression. Anxiety and depression and management both nonpharmacological and pharmacological are discussed quite lengthy with Kallie Anton including side effects of medications. She is agreeable to starting Paxil for anxiety and depression and will take this at night. She will also use Atarax to help with sleeping, she is to use NSAIDs to help with her chest pain. She does have frequent burping and some GI upset and was given GI cocktail in the ER which was somewhat helpful. With this being the case I will advise her to eat with NSAIDs, order Protonix, 40 mg tablets once in the morning. She is to take this on sip water weight 45 minutes before eating and drinking. Duanepearl Sloane verbalized understanding this, we will follow-up in a couple months or sooner if needed. Review of Systems   Constitutional:  Negative for chills, fatigue, fever and unexpected weight change. HENT:  Negative for congestion, ear pain, rhinorrhea, sinus pressure and sinus pain. Eyes:  Negative for discharge, itching and visual disturbance. Respiratory:  Positive for cough. Negative for chest tightness, shortness of breath and wheezing. Cardiovascular:  Positive for chest pain. Negative for palpitations and leg swelling.    Gastrointestinal:  Negative for abdominal pain, constipation, diarrhea and nausea. Endocrine: Negative for cold intolerance, heat intolerance, polydipsia and polyphagia. Genitourinary:  Negative for dysuria, flank pain and frequency. Musculoskeletal:  Negative for arthralgias, back pain and myalgias. Skin:  Negative for color change. Allergic/Immunologic: Negative for environmental allergies and food allergies. Neurological:  Negative for dizziness, weakness, light-headedness, numbness and headaches. Psychiatric/Behavioral:  Positive for dysphoric mood and sleep disturbance. Negative for agitation, behavioral problems, confusion, self-injury and suicidal ideas. The patient is nervous/anxious. Objective   Physical Exam  Vitals and nursing note reviewed. Constitutional:       General: She is not in acute distress. Appearance: Normal appearance. She is not ill-appearing. HENT:      Head: Normocephalic and atraumatic. Right Ear: Tympanic membrane and external ear normal.      Left Ear: Tympanic membrane and external ear normal.      Nose: Nose normal. No congestion or rhinorrhea. Mouth/Throat:      Mouth: Mucous membranes are moist.      Pharynx: Oropharynx is clear. No posterior oropharyngeal erythema. Eyes:      Pupils: Pupils are equal, round, and reactive to light. Cardiovascular:      Rate and Rhythm: Normal rate and regular rhythm. Pulses: Normal pulses. Heart sounds: Normal heart sounds. Pulmonary:      Effort: Pulmonary effort is normal.      Breath sounds: Normal breath sounds. No wheezing or rhonchi. Abdominal:      General: Bowel sounds are normal.      Palpations: There is no mass. Tenderness: There is no abdominal tenderness. Musculoskeletal:         General: No swelling or tenderness. Normal range of motion. Cervical back: Normal range of motion. No rigidity or tenderness. Skin:     General: Skin is warm and dry. Capillary Refill: Capillary refill takes less than 2 seconds. Coloration: Skin is not pale. Findings: No erythema. Neurological:      General: No focal deficit present. Mental Status: She is alert and oriented to person, place, and time. Psychiatric:         Mood and Affect: Mood normal.         Behavior: Behavior normal.         Thought Content: Thought content normal.         Judgment: Judgment normal.                An electronic signature was used to authenticate this note.     --Cecil Juarez, BELGICA - CNP

## 2022-12-07 ENCOUNTER — OFFICE VISIT (OUTPATIENT)
Dept: FAMILY MEDICINE CLINIC | Age: 37
End: 2022-12-07
Payer: MEDICARE

## 2022-12-07 VITALS
OXYGEN SATURATION: 98 % | WEIGHT: 100 LBS | BODY MASS INDEX: 18.4 KG/M2 | DIASTOLIC BLOOD PRESSURE: 86 MMHG | TEMPERATURE: 98.6 F | HEIGHT: 62 IN | RESPIRATION RATE: 16 BRPM | SYSTOLIC BLOOD PRESSURE: 136 MMHG | HEART RATE: 79 BPM

## 2022-12-07 DIAGNOSIS — F41.9 ANXIETY: ICD-10-CM

## 2022-12-07 DIAGNOSIS — R05.3 CHRONIC COUGH: ICD-10-CM

## 2022-12-07 DIAGNOSIS — R09.81 CONGESTION OF NASAL SINUS: ICD-10-CM

## 2022-12-07 DIAGNOSIS — R51.9 SINUS HEADACHE: ICD-10-CM

## 2022-12-07 DIAGNOSIS — R42 VERTIGO: Primary | ICD-10-CM

## 2022-12-07 PROCEDURE — 4004F PT TOBACCO SCREEN RCVD TLK: CPT | Performed by: NURSE PRACTITIONER

## 2022-12-07 PROCEDURE — G8484 FLU IMMUNIZE NO ADMIN: HCPCS | Performed by: NURSE PRACTITIONER

## 2022-12-07 PROCEDURE — 99214 OFFICE O/P EST MOD 30 MIN: CPT | Performed by: NURSE PRACTITIONER

## 2022-12-07 PROCEDURE — G8427 DOCREV CUR MEDS BY ELIG CLIN: HCPCS | Performed by: NURSE PRACTITIONER

## 2022-12-07 PROCEDURE — G8419 CALC BMI OUT NRM PARAM NOF/U: HCPCS | Performed by: NURSE PRACTITIONER

## 2022-12-07 PROCEDURE — PBSHW PBB SHADOW CHARGE: Performed by: NURSE PRACTITIONER

## 2022-12-07 PROCEDURE — 99211 OFF/OP EST MAY X REQ PHY/QHP: CPT | Performed by: NURSE PRACTITIONER

## 2022-12-07 RX ORDER — FLUTICASONE PROPIONATE 50 MCG
1 SPRAY, SUSPENSION (ML) NASAL 2 TIMES DAILY
Qty: 3 EACH | Refills: 0 | Status: SHIPPED | OUTPATIENT
Start: 2022-12-07

## 2022-12-07 RX ORDER — BUSPIRONE HYDROCHLORIDE 5 MG/1
5 TABLET ORAL 3 TIMES DAILY
Qty: 90 TABLET | Refills: 0 | Status: SHIPPED | OUTPATIENT
Start: 2022-12-07 | End: 2023-01-06

## 2022-12-07 RX ORDER — LORATADINE 10 MG/1
10 TABLET ORAL DAILY
Qty: 30 TABLET | Refills: 0 | Status: SHIPPED | OUTPATIENT
Start: 2022-12-07 | End: 2023-01-06

## 2022-12-07 RX ORDER — MECLIZINE HYDROCHLORIDE 25 MG/1
25 TABLET ORAL 3 TIMES DAILY PRN
Qty: 30 TABLET | Refills: 0 | Status: SHIPPED | OUTPATIENT
Start: 2022-12-07 | End: 2022-12-17

## 2022-12-07 SDOH — ECONOMIC STABILITY: FOOD INSECURITY: WITHIN THE PAST 12 MONTHS, THE FOOD YOU BOUGHT JUST DIDN'T LAST AND YOU DIDN'T HAVE MONEY TO GET MORE.: NEVER TRUE

## 2022-12-07 SDOH — ECONOMIC STABILITY: FOOD INSECURITY: WITHIN THE PAST 12 MONTHS, YOU WORRIED THAT YOUR FOOD WOULD RUN OUT BEFORE YOU GOT MONEY TO BUY MORE.: NEVER TRUE

## 2022-12-07 ASSESSMENT — PATIENT HEALTH QUESTIONNAIRE - PHQ9
SUM OF ALL RESPONSES TO PHQ QUESTIONS 1-9: 3
5. POOR APPETITE OR OVEREATING: 1
9. THOUGHTS THAT YOU WOULD BE BETTER OFF DEAD, OR OF HURTING YOURSELF: 0
SUM OF ALL RESPONSES TO PHQ9 QUESTIONS 1 & 2: 0
2. FEELING DOWN, DEPRESSED OR HOPELESS: 0
SUM OF ALL RESPONSES TO PHQ QUESTIONS 1-9: 3
SUM OF ALL RESPONSES TO PHQ QUESTIONS 1-9: 3
8. MOVING OR SPEAKING SO SLOWLY THAT OTHER PEOPLE COULD HAVE NOTICED. OR THE OPPOSITE, BEING SO FIGETY OR RESTLESS THAT YOU HAVE BEEN MOVING AROUND A LOT MORE THAN USUAL: 0
3. TROUBLE FALLING OR STAYING ASLEEP: 1
7. TROUBLE CONCENTRATING ON THINGS, SUCH AS READING THE NEWSPAPER OR WATCHING TELEVISION: 0
10. IF YOU CHECKED OFF ANY PROBLEMS, HOW DIFFICULT HAVE THESE PROBLEMS MADE IT FOR YOU TO DO YOUR WORK, TAKE CARE OF THINGS AT HOME, OR GET ALONG WITH OTHER PEOPLE: 0
4. FEELING TIRED OR HAVING LITTLE ENERGY: 1
SUM OF ALL RESPONSES TO PHQ QUESTIONS 1-9: 3
1. LITTLE INTEREST OR PLEASURE IN DOING THINGS: 0
6. FEELING BAD ABOUT YOURSELF - OR THAT YOU ARE A FAILURE OR HAVE LET YOURSELF OR YOUR FAMILY DOWN: 0

## 2022-12-07 ASSESSMENT — SOCIAL DETERMINANTS OF HEALTH (SDOH): HOW HARD IS IT FOR YOU TO PAY FOR THE VERY BASICS LIKE FOOD, HOUSING, MEDICAL CARE, AND HEATING?: NOT HARD AT ALL

## 2022-12-07 ASSESSMENT — ENCOUNTER SYMPTOMS
NAUSEA: 1
COUGH: 1
SINUS PAIN: 1

## 2022-12-07 NOTE — PATIENT INSTRUCTIONS
Meclizine for vertigo and nausea    Try snacking on healthy foods throughout the day that includes some protein. Try to make sure that you are drinking enough water throughout the day. 64 ounces per day on average is recommended. Recommended over the counter medications/treatments: The use of an antihistamine (Claritin or Zyrtec) and a nasal steroid spray (Flonase or Nasacort) may help with sinus congestion and drainage. Mucinex will also help thin secretions and improve congestion. Works best if drinking plenty of water. Honey with or without Lemon may also help with coughing. Probiotics daily may help boost immune system. Alternating hot liquids with cold liquids helps to sooth sore throat  Use Acetaminophen (Tylenol) to help relieve fever, chills or body aches. If allowed, you may alternate using ibuprofen (Motrin) and Tylenol. Do not exceed 3,000mg of Tylenol in a 24 hour time period. Make sure to stay well hydrated by drinking plenty of water. Follow up with primary care provider in 1 to 2 days or as needed if no improvement or worsening of your symptoms.

## 2022-12-07 NOTE — LETTER
[facility]  [address]  Phone: [phone]  Fax: [phone]    BELGICA Callahan CNP        December 7, 2022     Patient: Karen Wolff   YOB: 1985   Date of Visit: 12/7/2022       To Whom It May Concern: It is my medical opinion that Karen Wolff may return to work on 12/8/2022. If you have any questions or concerns, please don't hesitate to call.     Sincerely,        BELGICA Callahan CNP

## 2022-12-07 NOTE — PROGRESS NOTES
[facility]  [address] [address]  Phone: [phone]  Fax: [phone]      Marilyn Mckeon is a 40 y.o. female who presents to the [facility] or [address] [place] [address] clinic today for her medical conditions/complaints as noted below. Marilyn Mckeon is c/o of Dizziness (Pt presents to walk in with complaints of dizziness and nausea that gets the worst at night between after 5/6 pm, pt describes it as feeling like her body is moving but she is not.) and Concern For COVID-19 (Pt was also around her son, and parents who tested positive for covid. Pt also reports sinus congestion and cough for the last 2/3 months. Would also like albuterol inhaler refilled)      HPI:     Dizziness  The current episode started 1 to 4 weeks ago (2 weeks). The problem occurs intermittently. The problem has been gradually worsening. Associated symptoms include congestion, coughing (nonproductive for about 2-3 months), fatigue (slept alot yesterday), headaches (frontal), nausea (with vertigo) and vertigo. Exacerbated by: position changes. Worse with sitting than standing. She has tried nothing for the symptoms. The treatment provided no relief. Past Medical History:   Past Medical History:   Diagnosis Date    Allergic rhinitis     Anxiety and depression     Asthma     as a child, no recent symptoms    Gestational hypertension     history of    Hyperopia with astigmatism     Panic disorder     Tobacco use           Past Surgical History:  has a past surgical history that includes  section, low transverse (2006); Endometrial ablation (); and Sinus endoscopy (N/A, 2021). Allergies: Allergies   Allergen Reactions    Penicillins Rash         Social History:  reports that she has been smoking cigarettes. She has a 10.00 pack-year smoking history.  She has never used smokeless tobacco. She reports that she does not drink alcohol and does not use drugs. Wt Readings from Last 3 Encounters:   12/07/22 100 lb (45.4 kg)   10/12/22 101 lb (45.8 kg)   01/10/22 105 lb (47.6 kg)     BP Readings from Last 3 Encounters:   12/07/22 136/86   10/12/22 104/64   01/10/22 120/82      Temp Readings from Last 3 Encounters:   12/07/22 98.6 °F (37 °C) (Tympanic)   01/10/22 99.1 °F (37.3 °C)   06/10/21 98.6 °F (37 °C)     Pulse Readings from Last 3 Encounters:   12/07/22 79   10/12/22 61   01/10/22 69     SpO2 Readings from Last 3 Encounters:   12/07/22 98%   10/12/22 98%   01/10/22 99%       Subjective:      Review of Systems   Constitutional:  Positive for fatigue (slept alot yesterday). HENT:  Positive for congestion and sinus pain. Respiratory:  Positive for cough (nonproductive for about 2-3 months). Gastrointestinal:  Positive for nausea (with vertigo). Neurological:  Positive for dizziness, vertigo and headaches (frontal). All other systems reviewed and are negative. Objective:     Vitals:    12/07/22 0941 12/07/22 0947 12/07/22 0948   BP: 132/86 (!) 142/80 136/86   Site: Right Upper Arm Right Upper Arm Right Upper Arm   Position: Sitting Standing Supine   Cuff Size: Small Adult Small Adult Small Adult   Pulse: 81 91 79   Resp: 16     Temp: 98.6 °F (37 °C)     TempSrc: Tympanic     SpO2: 98%     Weight: 100 lb (45.4 kg)     Height: 5' 2\" (1.575 m)       Body mass index is 18.29 kg/m². /86 (Site: Right Upper Arm, Position: Supine, Cuff Size: Small Adult)   Pulse 79   Temp 98.6 °F (37 °C) (Tympanic)   Resp 16   Ht 5' 2\" (1.575 m)   Wt 100 lb (45.4 kg)   SpO2 98%   BMI 18.29 kg/m²   Physical Exam  Vitals and nursing note reviewed. Constitutional:       General: She is not in acute distress. Appearance: She is well-developed. She is not ill-appearing. HENT:      Right Ear: Hearing, tympanic membrane, ear canal and external ear normal. There is no impacted cerumen.       Left Ear: Hearing, tympanic membrane, ear canal and external ear normal. There is no impacted cerumen. Nose: Congestion present. Right Turbinates: Swollen. Left Turbinates: Swollen. Right Sinus: Frontal sinus tenderness present. Left Sinus: Frontal sinus tenderness present. Mouth/Throat:      Lips: Pink. Mouth: Mucous membranes are moist.      Pharynx: Uvula midline. Posterior oropharyngeal erythema (slight) present. No oropharyngeal exudate or uvula swelling. Tonsils: No tonsillar exudate or tonsillar abscesses. Comments: Small amount of mucus noted in the pharynx. Eyes:      Conjunctiva/sclera: Conjunctivae normal.      Pupils: Pupils are equal, round, and reactive to light. Neck:      Thyroid: No thyromegaly. Cardiovascular:      Rate and Rhythm: Normal rate and regular rhythm. Pulses: Normal pulses. Heart sounds: Normal heart sounds. No murmur heard. Pulmonary:      Effort: Pulmonary effort is normal. No respiratory distress. Breath sounds: Normal breath sounds. No decreased air movement or transmitted upper airway sounds. No decreased breath sounds, wheezing, rhonchi or rales. Abdominal:      Palpations: Abdomen is soft. Musculoskeletal:         General: Normal range of motion. Cervical back: Normal range of motion and neck supple. Lymphadenopathy:      Cervical: No cervical adenopathy. Right cervical: No superficial or posterior cervical adenopathy. Left cervical: No superficial or posterior cervical adenopathy. Upper Body:      Right upper body: No supraclavicular adenopathy. Left upper body: No supraclavicular adenopathy. Skin:     General: Skin is warm and dry. Capillary Refill: Capillary refill takes less than 2 seconds. Findings: No rash. Neurological:      General: No focal deficit present. Mental Status: She is alert and oriented to person, place, and time. Mental status is at baseline.    Psychiatric:         Attention and Perception: Attention normal.         Mood and Affect: Mood normal.         Speech: Speech normal.         Behavior: Behavior normal.         Judgment: Judgment normal.       Assessment and Plan      Diagnosis Orders   1. Vertigo  meclizine (ANTIVERT) 25 MG tablet      2. Chronic cough  loratadine (CLARITIN) 10 MG tablet      3. Sinus headache  loratadine (CLARITIN) 10 MG tablet    Phenylephrine-Acetaminophen 5-325 MG CAPS      4. Congestion of nasal sinus  fluticasone (FLONASE) 50 MCG/ACT nasal spray    loratadine (CLARITIN) 10 MG tablet      5. Anxiety  busPIRone (BUSPAR) 5 MG tablet        Orders Placed This Encounter    fluticasone (FLONASE) 50 MCG/ACT nasal spray     Si spray by Each Nostril route in the morning and at bedtime     Dispense:  3 each     Refill:  0    loratadine (CLARITIN) 10 MG tablet     Sig: Take 1 tablet by mouth daily     Dispense:  30 tablet     Refill:  0    meclizine (ANTIVERT) 25 MG tablet     Sig: Take 1 tablet by mouth 3 times daily as needed for Dizziness or Nausea     Dispense:  30 tablet     Refill:  0    busPIRone (BUSPAR) 5 MG tablet     Sig: Take 1 tablet by mouth 3 times daily     Dispense:  90 tablet     Refill:  0    Phenylephrine-Acetaminophen 5-325 MG CAPS     Sig: Take 2 tablets by mouth every 4 hours as needed (Sinus headaches)     Dispense:  30 capsule     Refill:  0     Discussed symptoms and management of Anxiety. She became lethargic and sleepy with use of Hydroxyzine so we discussed trial of Buspar. Dizziness is more vertigo in nature based on head movements and dizziness worse when laying down. Will prescribe Antivert TID PRN. Tylenol sinus (Acetaminophen and phenylephrine) recommended for Sinus headaches. For her chronic coughing she was encouraged to take Claritin and use Flonase daily due to post nasal drip influencing/inducing her cough.  She was encouraged to keep previously scheduled appointment with PCP to discuss refill of her inhalers and also to give update on her anxiety symptoms. Has been on Paxil for about 2 months and may benefit from dose adjustments. Discussed importance of taking this medication on a regular basis. Discussed exam, POCT findings, plan of care, and follow-up at length with patient. Reviewed all prescribed and recommended medications, administration and side effects. Encouraged patient to follow up with PCP or return to the clinic for no improvement and or worsening of symptoms. All questions were answered and they verbalized understanding and were agreeable with the plan. Follow up as needed.       Electronically signed by BELGICA Wolff CNP on 12/7/2022 at 10:11 AM

## 2023-02-13 ENCOUNTER — OFFICE VISIT (OUTPATIENT)
Dept: FAMILY MEDICINE CLINIC | Age: 38
End: 2023-02-13
Payer: MEDICAID

## 2023-02-13 VITALS
BODY MASS INDEX: 21.22 KG/M2 | HEART RATE: 78 BPM | SYSTOLIC BLOOD PRESSURE: 140 MMHG | DIASTOLIC BLOOD PRESSURE: 84 MMHG | WEIGHT: 116 LBS | OXYGEN SATURATION: 98 %

## 2023-02-13 DIAGNOSIS — K04.7 DENTAL INFECTION: Primary | ICD-10-CM

## 2023-02-13 PROCEDURE — 99212 OFFICE O/P EST SF 10 MIN: CPT

## 2023-02-13 PROCEDURE — 99213 OFFICE O/P EST LOW 20 MIN: CPT

## 2023-02-13 RX ORDER — CHLORHEXIDINE GLUCONATE 0.12 MG/ML
RINSE ORAL
COMMUNITY
Start: 2023-02-06

## 2023-02-13 RX ORDER — CLINDAMYCIN HYDROCHLORIDE 300 MG/1
300 CAPSULE ORAL 3 TIMES DAILY
Qty: 30 CAPSULE | Refills: 0 | Status: SHIPPED | OUTPATIENT
Start: 2023-02-13 | End: 2023-02-23

## 2023-02-13 RX ORDER — IBUPROFEN 800 MG/1
TABLET ORAL
COMMUNITY
Start: 2023-02-06

## 2023-02-13 RX ORDER — ACETAMINOPHEN AND CODEINE PHOSPHATE 300; 30 MG/1; MG/1
TABLET ORAL
COMMUNITY
Start: 2023-02-06

## 2023-02-13 RX ORDER — CEFDINIR 300 MG/1
300 CAPSULE ORAL 2 TIMES DAILY
Qty: 20 CAPSULE | Refills: 0 | Status: SHIPPED | OUTPATIENT
Start: 2023-02-13 | End: 2023-02-13

## 2023-02-13 RX ORDER — METRONIDAZOLE 500 MG/1
500 TABLET ORAL 3 TIMES DAILY
Qty: 30 TABLET | Refills: 0 | Status: SHIPPED | OUTPATIENT
Start: 2023-02-13 | End: 2023-02-23

## 2023-02-13 RX ORDER — METHYLPREDNISOLONE 4 MG/1
TABLET ORAL
COMMUNITY
Start: 2023-02-06

## 2023-02-13 NOTE — PROGRESS NOTES
Fabián Barrientos (:  1985) is a 40 y.o. female,Established patient, here for evaluation of the following chief complaint(s):  Dental Pain (Patient is here today for mouth pain after having 11 teeth removed. She is in severe pain and has been since before the surgery. They had only been given a zpack for the infection before the teeth being pulled. She was unable to get into the dentist this week. )         ASSESSMENT/PLAN:  1. Dental infection  -     metroNIDAZOLE (FLAGYL) 500 MG tablet; Take 1 tablet by mouth 3 times daily for 10 days, Disp-30 tablet, R-0Normal  -     clindamycin (CLEOCIN) 300 MG capsule; Take 1 capsule by mouth 3 times daily for 10 days, Disp-30 capsule, R-0Normal  Leyda Crawley is allergic to penicillins, Omnicef is on backorder we will use clindamycin 1 capsule 3 times daily for 10 days. Risk of C. difficile as well as other side effects with clindamycin is discussed. She is not to drink alcohol while taking Flagyl. Leyda Crawley verbalized understanding of this, she should follow-up with her dentist soon as possible as well as salt water rinse and good hygiene in the meantime    Return if symptoms worsen or fail to improve. Subjective   SUBJECTIVE/OBJECTIVE:  Leyda Crawley is here today for pain in the left lower portion of her jaw. Is status post dental extraction of 11 teeth. 3 molars from this left lower side. She states this was a week ago, still has sutures in place. She was unable to get into her dentist, and she is still having refractory pain on this side and thinks there is an infection. Physical exam is noted below to not have erythremia, purulent drainage however with this pain I will treat with antibiotics. Review of Systems   Constitutional:  Negative for chills, fatigue and fever. HENT:  Positive for dental problem. Negative for congestion. Respiratory:  Negative for cough, chest tightness and shortness of breath. Cardiovascular:  Negative for chest pain. Gastrointestinal:  Negative for abdominal pain. Objective   Physical Exam  Vitals and nursing note reviewed. Constitutional:       Appearance: Normal appearance. HENT:      Head: Normocephalic. Right Ear: External ear normal.      Left Ear: External ear normal.      Mouth/Throat:      Mouth: Mucous membranes are moist.      Dentition: Dental tenderness, gingival swelling and dental caries present. No gum lesions. Comments: These areas are removed teeth with sutures intact. Some swelling without erythema. Pain is localized more so to the bottom left portion of jaw  Cardiovascular:      Rate and Rhythm: Normal rate and regular rhythm. Pulses: Normal pulses. Heart sounds: Normal heart sounds. No murmur heard. Pulmonary:      Effort: Pulmonary effort is normal.      Breath sounds: No wheezing. Skin:     General: Skin is warm and dry. Capillary Refill: Capillary refill takes less than 2 seconds. Neurological:      Mental Status: She is alert. An electronic signature was used to authenticate this note.     --Rose Cain, BELGICA - CNP

## 2023-02-14 ASSESSMENT — ENCOUNTER SYMPTOMS
SHORTNESS OF BREATH: 0
COUGH: 0
CHEST TIGHTNESS: 0
ABDOMINAL PAIN: 0

## 2023-06-28 ENCOUNTER — TELEPHONE (OUTPATIENT)
Dept: FAMILY MEDICINE CLINIC | Age: 38
End: 2023-06-28

## 2023-07-19 DIAGNOSIS — R51.9 SINUS HEADACHE: ICD-10-CM

## 2023-07-19 RX ORDER — PHENYLEPHRINE HCL/ACETAMINOPHN 5 MG-325MG
TABLET ORAL
Qty: 24 TABLET | OUTPATIENT
Start: 2023-07-19

## 2025-01-03 LAB
ANION GAP SERPL CALCULATED.3IONS-SCNC: 5.5 MMOL/L (ref 3–11)
BASOPHILS ABSOLUTE: 0.06 X10E3/?L (ref 0–0.3)
BASOPHILS RELATIVE PERCENT: 0.73 % (ref 0–3)
BUN BLDV-MCNC: 9 MG/DL (ref 7–17)
CALCIUM SERPL-MCNC: 9.5 MG/DL (ref 8.4–10.2)
CHLORIDE BLD-SCNC: 104 MMOL/L (ref 98–120)
CO2: 28 MMOL/L (ref 22–31)
CREAT SERPL-MCNC: 0.6 MG/DL (ref 0.5–1)
EOSINOPHILS ABSOLUTE: 0.16 X10E3/?L (ref 0–1.1)
EOSINOPHILS RELATIVE PERCENT: 1.92 % (ref 0–10)
GFR, ESTIMATED: > 60
GLUCOSE: 91 MG/DL (ref 65–105)
HCT VFR BLD CALC: 44.4 % (ref 37–47)
HEMOGLOBIN: 14.5 G/DL (ref 12–16)
LYMPHOCYTES ABSOLUTE: 2.02 X10E3/?L (ref 1–5.5)
LYMPHOCYTES RELATIVE PERCENT: 24.01 % (ref 20–51.1)
MCH RBC QN AUTO: 32.9 PG (ref 28.5–32.5)
MCHC RBC AUTO-ENTMCNC: 32.7 G/DL (ref 32–37)
MCV RBC AUTO: 100.4 FL (ref 80–94)
MONOCYTES ABSOLUTE: 0.52 X10E3/?L (ref 0.1–1)
MONOCYTES RELATIVE PERCENT: 6.23 % (ref 1.7–9.3)
NEUTROPHILS ABSOLUTE: 5.65 X10E3/?L (ref 2–8.1)
NEUTROPHILS RELATIVE PERCENT: 67.1 % (ref 42.2–75.2)
PDW BLD-RTO: 11 % (ref 8.5–15.5)
PLATELET # BLD: 310.4 THOU/MM3 (ref 130–400)
POTASSIUM SERPL-SCNC: 3.9 MMOL/L (ref 3.6–5)
RBC # BLD: 4.42 M/UL (ref 4.2–5.4)
SODIUM BLD-SCNC: 137 MMOL/L (ref 135–145)
WBC # BLD: 8.4 THOU/ML3 (ref 4.8–10.8)

## 2025-01-06 LAB — PREGNANCY, URINE: NEGATIVE

## 2025-03-26 NOTE — PROGRESS NOTES
[facility], [facility]YN[facility]   [address] [place] [address]  Dept: [phone]  Date: 3/28/2025  Patient Name: Karina Ortiz    VISIT - NEW PATIENT VISIT     CC: had concerns including New Patient (Patient c/o urinary retention with urinary frequency x 1 year /Patient denies working with PFT).    HPI:   History of Present Illness  AUA Symptom Score Sheet  Over the last month, how often have you had a sensation of not emptying your bladder completely after you finished urinating?: (!) Almost always  During the last month, how often have you had to urinate again less than two hours after you finished urinating?: About half the time  During the last month, how often have you stopped and started again several times when you urinated?: About half the time  During the last month, how often have you found it difficult to postpone urination?: Less than half the time  During the last month, how often have you had a weak urinary stream?: About half the time  During the last month, how often have you had to push or strain to begin urination?: More than half the time  During the last month, how many times did you most typically get up to urinate from the time you went to bed at night until the time you got up in the morning?: Less than 1 time in 5  Symptom Score 1 - 7 Mild, 8 - 19 Moderate, 20 - 35 Severe: 21       The patient is a 39-year-old female who presents as a new patient to discuss urinary frequency and feeling like she is retaining urine. She is accompanied by her mother.    She reports experiencing bladder pressure, which are characterized by a sensation of a full bladder and an urgent need to urinate, often resulting in minimal urine output. This is followed by another urge to urinate within 5 minutes, sometimes with increased volume. She does not experience incontinence during these episodes but admits to

## 2025-03-28 ENCOUNTER — OFFICE VISIT (OUTPATIENT)
Age: 40
End: 2025-03-28

## 2025-03-28 VITALS
DIASTOLIC BLOOD PRESSURE: 85 MMHG | SYSTOLIC BLOOD PRESSURE: 121 MMHG | TEMPERATURE: 98.5 F | HEART RATE: 90 BPM | OXYGEN SATURATION: 99 %

## 2025-03-28 DIAGNOSIS — R33.9 URINARY RETENTION: ICD-10-CM

## 2025-03-28 DIAGNOSIS — R35.1 NOCTURIA: ICD-10-CM

## 2025-03-28 DIAGNOSIS — R35.0 URINARY FREQUENCY: ICD-10-CM

## 2025-03-28 DIAGNOSIS — N39.3 SUI (STRESS URINARY INCONTINENCE, FEMALE): ICD-10-CM

## 2025-03-28 DIAGNOSIS — R39.15 URINARY URGENCY: Primary | ICD-10-CM

## 2025-03-28 LAB
BILIRUBIN, POC: NORMAL
BLOOD URINE, POC: 50
CLARITY, POC: CLEAR
COLOR, POC: YELLOW
GLUCOSE URINE, POC: NORMAL MG/DL
KETONES, POC: NORMAL MG/DL
LEUKOCYTE EST, POC: NORMAL
NITRITE, POC: NORMAL
PH, POC: 5
PROTEIN, POC: NORMAL MG/DL
SPECIFIC GRAVITY, POC: 1.02
UROBILINOGEN, POC: NORMAL MG/DL

## 2025-03-28 RX ORDER — CETIRIZINE HYDROCHLORIDE 10 MG/1
10 TABLET ORAL EVERY EVENING
COMMUNITY

## 2025-03-28 NOTE — PATIENT INSTRUCTIONS
[facility]    DIET & DAILY HABITS  Can this affect your bladder or bowel control?    There is no “diet” to cure urinary or fecal problems.  However, there are certain dietary matters you should be concerned about.    Many people who have bladder control problems reduce the amount of liquids they drink in hope they will urinate less often.  Although less fluid intake results in less urine production, the smaller amount is more concentrated and thus, is more irritating to the bladder surface.  Highly concentrated urine (dark yellow, strong-smelling) urine may cause you to go to the bathroom more frequently.  It also encourages the growth of bacteria, which may lead to a urinary tract infection.  Do not restrict fluids to control incontinence without advice from your physician.  Always follow your health care provider's instructions.    Some foods cause urine to smell bad or peculiar.  The most notable food is asparagus.  Another cause of foul-smelling urine, and the most dangerous cause, is urinary tract infection.  If you notice that your urine has a strong odor and you have not eaten any foods that would cause this, you should see a health care provider and have a specimen of your urine tested for infection.    Some medicines may cause your urine to be discolored or have an unusual odor.  Some are medicines that you take for bladder inflammation or for urine tests.  If your urine has a peculiar color or odor, consult the pharmacist who filled your prescription.    Some foods and beverages are though to contribute to bladder leakage.  Their effect of the bladder is not always understood, but you may want to see if eliminating one or all of the items listed improves your urine control.      Common irritants include: alcohol, carbonated beverages, caffeine, milk, coffee (even decaffeinated), tea, medicines with caffeine, citrus juice and fruits, tomatoes, tomato-based

## (undated) DEVICE — KIT,ANTI FOG,W/SPONGE & FLUID,SOFT PACK: Brand: MEDLINE

## (undated) DEVICE — SYSTEM DIL BLLN DIA6MM MULTISINUS

## (undated) DEVICE — SYRINGE MED 10ML LUERLOCK TIP W/O SFTY DISP

## (undated) DEVICE — SPLINT 1524055 DOYLE II AIRWAY SET: Brand: DOYLE II ™

## (undated) DEVICE — TUBING, SUCTION, 3/16" X 20', STRAIGHT: Brand: MEDLINE

## (undated) DEVICE — GARMENT,MEDLINE,DVT,INT,CALF,MED, GEN2: Brand: MEDLINE

## (undated) DEVICE — GAUZE,SPONGE,4"X4",16PLY,XRAY,STRL,LF: Brand: MEDLINE

## (undated) DEVICE — SUTURE ETHLN SZ 2-0 L30IN NONABSORBABLE BLK L60MM KS STR 628H

## (undated) DEVICE — STANDARD HYPODERMIC NEEDLE,POLYPROPYLENE HUB: Brand: MONOJECT

## (undated) DEVICE — SPONGE,NEURO,0.5"X3",XR,STRL,LF,10/PK: Brand: MEDLINE

## (undated) DEVICE — SUTURE CHROMIC GUT SZ 4-0 L18IN ABSRB BRN L13MM P-3 3/8 CIR 1654G

## (undated) DEVICE — TOWEL,OR,DSP,ST,BLUE,STD,4/PK,20PK/CS: Brand: MEDLINE

## (undated) DEVICE — GLOVE SURG SZ 6 THK91MIL LTX FREE SYN POLYISOPRENE ANTI

## (undated) DEVICE — GLOVE ORANGE PI 7   MSG9070

## (undated) DEVICE — 3M™ WARMING BLANKET, FULL BODY, 10 PER CASE, 40068: Brand: BAIR HUGGER™

## (undated) DEVICE — PAD,EYE,1-5/8X2 5/8,STERILE,LF,1/PK: Brand: MEDLINE

## (undated) DEVICE — Device

## (undated) DEVICE — MARKER W/PRE PRINTED CUSTOM LABEL

## (undated) DEVICE — SYRINGE MED 5ML STD CLR PLAS LUERLOCK TIP N CTRL DISP

## (undated) DEVICE — 1810 FOAM BLOCK NEEDLE COUNTER: Brand: DEVON

## (undated) DEVICE — DRAPE,SPLIT ,77X120: Brand: MEDLINE

## (undated) DEVICE — 4-PORT MANIFOLD: Brand: NEPTUNE 2

## (undated) DEVICE — COVER LT HNDL BLU PLAS

## (undated) DEVICE — PACK SURG PROC REMINDER N WVN DISPOSABLE BEAC TIME OUT

## (undated) DEVICE — PACK SET UP

## (undated) DEVICE — 9165 UNIVERSAL PATIENT PLATE: Brand: 3M™

## (undated) DEVICE — 3M™ WARMING BLANKET, LOWER BODY, 10 PER CASE, 42568: Brand: BAIR HUGGER™

## (undated) DEVICE — DRAPE,REIN 53X77,STERILE: Brand: MEDLINE

## (undated) DEVICE — SOLUTION IV IRRIG POUR BRL 0.9% SODIUM CHL 2F7124

## (undated) DEVICE — SWABSTICK MEDICATED 10% POVIDONE IOD PVP TRIPE ANTISEP SAT

## (undated) DEVICE — SUTURE ABSORBABLE MONOFILAMENT 4-0 SC-1 18 IN PLN GUT 1824H